# Patient Record
Sex: FEMALE
[De-identification: names, ages, dates, MRNs, and addresses within clinical notes are randomized per-mention and may not be internally consistent; named-entity substitution may affect disease eponyms.]

---

## 2017-03-14 ENCOUNTER — HOSPITAL (OUTPATIENT)
Dept: OTHER | Age: 27
End: 2017-03-14
Attending: EMERGENCY MEDICINE

## 2017-03-14 ENCOUNTER — IMAGING SERVICES (OUTPATIENT)
Dept: OTHER | Age: 27
End: 2017-03-14

## 2017-03-14 LAB
ALBUMIN SERPL-MCNC: 3.9 GM/DL (ref 3.6–5.1)
ALP SERPL-CCNC: 66 UNIT/L (ref 45–117)
ALT SERPL-CCNC: 15 UNIT/L
ANA SER QL IA: NEGATIVE
ANALYZER ANC (IANC): ABNORMAL
ANION GAP SERPL CALC-SCNC: 14 MMOL/L (ref 10–20)
AST SERPL-CCNC: 15 UNIT/L
BASOPHILS # BLD: 0 THOUSAND/MCL (ref 0–0.3)
BASOPHILS NFR BLD: 0 %
BILIRUB CONJ SERPL-MCNC: 0.1 MG/DL (ref 0–0.2)
BILIRUB SERPL-MCNC: 0.7 MG/DL (ref 0.2–1)
BUN SERPL-MCNC: 9 MG/DL (ref 6–20)
BUN/CREAT SERPL: 14 (ref 7–25)
CALCIUM SERPL-MCNC: 9.1 MG/DL (ref 8.4–10.2)
CHLORIDE: 103 MMOL/L (ref 98–107)
CO2 SERPL-SCNC: 26 MMOL/L (ref 21–32)
CREAT SERPL-MCNC: 0.64 MG/DL (ref 0.51–0.95)
DIFFERENTIAL METHOD BLD: ABNORMAL
EOSINOPHIL # BLD: 0.1 THOUSAND/MCL (ref 0.1–0.5)
EOSINOPHIL NFR BLD: 1 %
ERYTHROCYTE [DISTWIDTH] IN BLOOD: 11.3 % (ref 11–15)
ERYTHROCYTE [SEDIMENTATION RATE] IN BLOOD BY WESTERGREN METHOD: 5 MM/HR (ref 0–20)
GLUCOSE SERPL-MCNC: 111 MG/DL (ref 65–99)
HEMATOCRIT: 39.2 % (ref 36–46.5)
HGB BLD-MCNC: 13.8 GM/DL (ref 12–15.5)
LIPASE SERPL-CCNC: 111 UNIT/L (ref 73–393)
LYMPHOCYTES # BLD: 3.9 THOUSAND/MCL (ref 1–4.8)
LYMPHOCYTES NFR BLD: 33 %
MCH RBC QN AUTO: 32.4 PG (ref 26–34)
MCHC RBC AUTO-ENTMCNC: 35.2 GM/DL (ref 32–36.5)
MCV RBC AUTO: 92 FL (ref 78–100)
MONOCYTES # BLD: 0.6 THOUSAND/MCL (ref 0.3–0.9)
MONOCYTES NFR BLD: 5 %
NEUTROPHILS # BLD: 7.1 THOUSAND/MCL (ref 1.8–7.7)
NEUTROPHILS NFR BLD: 61 %
NEUTS SEG NFR BLD: ABNORMAL %
PERCENT NRBC: ABNORMAL
PLATELET # BLD: 246 THOUSAND/MCL (ref 140–450)
POTASSIUM SERPL-SCNC: 4 MMOL/L (ref 3.4–5.1)
PROT SERPL-MCNC: 7.2 GM/DL (ref 6.4–8.2)
RBC # BLD: 4.26 MILLION/MCL (ref 4–5.2)
RHEUMATOID FACT SER NEPH-ACNC: <10 UNIT/ML
SODIUM SERPL-SCNC: 139 MMOL/L (ref 135–145)
WBC # BLD: 11.7 THOUSAND/MCL (ref 4.2–11)

## 2017-03-17 ENCOUNTER — CHARTING TRANS (OUTPATIENT)
Dept: OTHER | Age: 27
End: 2017-03-17

## 2017-03-29 ENCOUNTER — CHARTING TRANS (OUTPATIENT)
Dept: OTHER | Age: 27
End: 2017-03-29

## 2017-03-29 ASSESSMENT — PAIN SCALES - GENERAL: PAINLEVEL_OUTOF10: 0

## 2018-11-05 VITALS
SYSTOLIC BLOOD PRESSURE: 110 MMHG | WEIGHT: 128 LBS | BODY MASS INDEX: 20.09 KG/M2 | DIASTOLIC BLOOD PRESSURE: 68 MMHG | HEIGHT: 67 IN

## 2022-01-28 ENCOUNTER — HOSPITAL ENCOUNTER (OUTPATIENT)
Dept: RADIOLOGY | Facility: HOSPITAL | Age: 32
Discharge: HOME | End: 2022-01-28
Attending: ORTHOPAEDIC SURGERY
Payer: COMMERCIAL

## 2022-01-28 ENCOUNTER — OFFICE VISIT (OUTPATIENT)
Dept: SPORTS MEDICINE | Facility: CLINIC | Age: 32
End: 2022-01-28
Payer: COMMERCIAL

## 2022-01-28 VITALS
BODY MASS INDEX: 21.94 KG/M2 | WEIGHT: 139.81 LBS | SYSTOLIC BLOOD PRESSURE: 121 MMHG | HEART RATE: 72 BPM | DIASTOLIC BLOOD PRESSURE: 81 MMHG | HEIGHT: 67 IN

## 2022-01-28 DIAGNOSIS — M25.512 LEFT SHOULDER PAIN, UNSPECIFIED CHRONICITY: ICD-10-CM

## 2022-01-28 DIAGNOSIS — M25.312 INSTABILITY OF BOTH SHOULDER JOINTS: Primary | ICD-10-CM

## 2022-01-28 DIAGNOSIS — M25.311 INSTABILITY OF BOTH SHOULDER JOINTS: Primary | ICD-10-CM

## 2022-01-28 PROCEDURE — 1160F RVW MEDS BY RX/DR IN RCRD: CPT | Mod: CPTII,S$GLB,, | Performed by: ORTHOPAEDIC SURGERY

## 2022-01-28 PROCEDURE — 73030 XR SHOULDER COMPLETE 2 OR MORE VIEWS LEFT: ICD-10-PCS | Mod: 26,LT,, | Performed by: RADIOLOGY

## 2022-01-28 PROCEDURE — 1159F MED LIST DOCD IN RCRD: CPT | Mod: CPTII,S$GLB,, | Performed by: ORTHOPAEDIC SURGERY

## 2022-01-28 PROCEDURE — 99204 OFFICE O/P NEW MOD 45 MIN: CPT | Mod: S$GLB,,, | Performed by: ORTHOPAEDIC SURGERY

## 2022-01-28 PROCEDURE — 99999 PR PBB SHADOW E&M-NEW PATIENT-LVL III: CPT | Mod: PBBFAC,,, | Performed by: ORTHOPAEDIC SURGERY

## 2022-01-28 PROCEDURE — 3074F SYST BP LT 130 MM HG: CPT | Mod: CPTII,S$GLB,, | Performed by: ORTHOPAEDIC SURGERY

## 2022-01-28 PROCEDURE — 3008F PR BODY MASS INDEX (BMI) DOCUMENTED: ICD-10-PCS | Mod: CPTII,S$GLB,, | Performed by: ORTHOPAEDIC SURGERY

## 2022-01-28 PROCEDURE — 3074F PR MOST RECENT SYSTOLIC BLOOD PRESSURE < 130 MM HG: ICD-10-PCS | Mod: CPTII,S$GLB,, | Performed by: ORTHOPAEDIC SURGERY

## 2022-01-28 PROCEDURE — 99204 PR OFFICE/OUTPT VISIT, NEW, LEVL IV, 45-59 MIN: ICD-10-PCS | Mod: S$GLB,,, | Performed by: ORTHOPAEDIC SURGERY

## 2022-01-28 PROCEDURE — 99999 PR PBB SHADOW E&M-NEW PATIENT-LVL III: ICD-10-PCS | Mod: PBBFAC,,, | Performed by: ORTHOPAEDIC SURGERY

## 2022-01-28 PROCEDURE — 1160F PR REVIEW ALL MEDS BY PRESCRIBER/CLIN PHARMACIST DOCUMENTED: ICD-10-PCS | Mod: CPTII,S$GLB,, | Performed by: ORTHOPAEDIC SURGERY

## 2022-01-28 PROCEDURE — 73030 X-RAY EXAM OF SHOULDER: CPT | Mod: TC,LT

## 2022-01-28 PROCEDURE — 3079F PR MOST RECENT DIASTOLIC BLOOD PRESSURE 80-89 MM HG: ICD-10-PCS | Mod: CPTII,S$GLB,, | Performed by: ORTHOPAEDIC SURGERY

## 2022-01-28 PROCEDURE — 73030 X-RAY EXAM OF SHOULDER: CPT | Mod: 26,LT,, | Performed by: RADIOLOGY

## 2022-01-28 PROCEDURE — 1159F PR MEDICATION LIST DOCUMENTED IN MEDICAL RECORD: ICD-10-PCS | Mod: CPTII,S$GLB,, | Performed by: ORTHOPAEDIC SURGERY

## 2022-01-28 PROCEDURE — 3008F BODY MASS INDEX DOCD: CPT | Mod: CPTII,S$GLB,, | Performed by: ORTHOPAEDIC SURGERY

## 2022-01-28 PROCEDURE — 3079F DIAST BP 80-89 MM HG: CPT | Mod: CPTII,S$GLB,, | Performed by: ORTHOPAEDIC SURGERY

## 2022-01-28 RX ORDER — DESOGESTREL AND ETHINYL ESTRADIOL 0.15-0.03
1 KIT ORAL DAILY
COMMUNITY
End: 2022-05-20 | Stop reason: SDUPTHER

## 2022-01-28 NOTE — PROGRESS NOTES
NEW PATIENT    HISTORY OF PRESENT ILLNESS   31 y.o. 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 She has a dog that keeps her busy. 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 She states that she feels like the shoulder dislocates. She states she also has neck pain.   She states her left shoulder has slipped out about 3 times.  She states she is feeling get unstable in her sleep.      - mechanical symptoms, + instability    Is affecting ADLs.  Pain is 2/10 at it's worst.     PAST MEDICAL HISTORY    History reviewed. No pertinent past medical history.    PAST SURGICAL HISTORY     Past Surgical History:   Procedure Laterality Date    APPENDECTOMY  2017    BIOPSY OF TONSILS  2005       FAMILY HISTORY    No family history on file.    SOCIAL HISTORY    Social History     Socioeconomic History    Marital status: Single       MEDICATIONS      Current Outpatient Medications:     desogestreL-ethinyl estradioL (APRI) 0.15-0.03 mg per tablet, Take 1 tablet by mouth once daily., Disp: , Rfl:     ALLERGIES     Review of patient's allergies indicates:  No Known Allergies      REVIEW OF SYSTEMS   Constitution: Negative. Negative for chills, fever and night sweats.   HENT: Negative for congestion and headaches.    Eyes: Negative for blurred vision, left vision loss and right vision loss.   Cardiovascular: Negative for chest pain and syncope.   Respiratory: Negative for cough and shortness of breath.    Endocrine: Negative for polydipsia, polyphagia and polyuria.   Hematologic/Lymphatic: Negative for bleeding problem. Does not bruise/bleed easily.   Skin: Negative for dry skin, itching and rash.   Musculoskeletal: Negative for falls. Positive for left shoulder pain  Gastrointestinal: Negative for abdominal pain and bowel incontinence.   Genitourinary: Negative for bladder incontinence and nocturia.   Neurological: Negative for disturbances in coordination, loss of balance and seizures.  "  Psychiatric/Behavioral: Negative for depression. The patient does not have insomnia.    Allergic/Immunologic: Negative for hives and persistent infections.     PHYSICAL EXAMINATION    Vitals: /81   Pulse 72   Ht 5' 7" (1.702 m)   Wt 63.4 kg (139 lb 12.8 oz)   BMI 21.90 kg/m²     General: The patient appears active and healthy with no apparent physical problems.  No disturbance of mood or affect is demonstrated. Alert and Oriented.    HEENT: Eyes normal, pupils equally round, nose normal.    Resp: Equal and symmetrical chest rises. No wheezing    CV: Regular rate    Neck: Supple; nonpainful range of motion.    Extremities: no cyanosis, clubbing, edema, or diffuse swelling.  Palpable pulses, good capillary refill of the digits.  No coolness, discoloration, edema or obvious varicosities.    Skin: no lesions noted.    Lymphatic: no detected adenopathy in the upper or lower extremities.    Neurologic: normal mental status, normal reflexes, normal gait and balance.  Patient is alert and oriented to person, place and time.  No flaccidity or spasticity is noted.  No motor or sensory deficits are noted.  Light touch is intact    Orthopaedic:     SHOULDER EXAM - LEFT    Inspection:   Normal skin color and appearance with no scars.  No muscle atrophy noted.  No scapular winging.      Palpation: No tenderness of the acromioclavicular joint, lateral edge of the acromion, biceps tendon, trapezius muscle or scapulothoracic bursa.      ROM:      PROM:     FE - 180°    Abd/ER -  90°  Abd/IR -  45°   Add/ER -  90°     AROM:    FE - 180°    Abd/ER -  90°  Abd/IR -  45°   Add/ER -  90    Tests:     - Aguirre, - Neer's, - Cross Arm Adduction, - Cowley, - Yerguson, - Speed. - Belly Press,  - Jobes, - Lift Off    Stability: + sulcus, - apprehension, - relocation, - load and shift, - DLS  2+ anterior 2+ inferior and 3+ posterior load and shift    Motor:  Rotator cuff strength is 5/5 supraspinatus, 5/5 infraspinatus, 5/5 " subscapularis. Biceps, triceps and deltoid strength is 5/5.      Neuro     Distally there are no paresthesias, and sensation is intact to light touch in the median, ulnar, and radial distributions.  Reflexes are 2/2 biceps, triceps and brachioradialis.      Beighton scale: 5/9    IMAGING    X-Ray Shoulder 2 or More Views Left  Narrative: EXAMINATION:  XR SHOULDER COMPLETE 2 OR MORE VIEWS LEFT    CLINICAL HISTORY:  Pain in left shoulder    TECHNIQUE:  Two or three views of the left shoulder were performed.    COMPARISON:  None    FINDINGS:  Glenohumeral joint space is normal.  Bony structures are intact.  No soft tissue calcification is seen.  Impression: Normal findings    Electronically signed by: Kiel Burgess MD  Date:    01/28/2022  Time:    10:26      IMPRESSION       ICD-10-CM ICD-9-CM   1. Instability of both shoulder joints  M25.311 718.81    M25.312    2. Left shoulder pain, unspecified chronicity  M25.512 719.41       MEDICATIONS PRESCRIBED      1. None    RECOMMENDATIONS     1. Referral to physical therapy placed today.  The patient has clearly demonstrated hyperlaxity with a diagnosis of multidirectional instability.  I educated the patient concerning this diagnosis and treatment options.  2. Posture shirt recommended; work desk modifications recommended  3. RTC in 6 weeks to assess improvement with therapy      All questions were answered, pt will contact us for questions or concerns in the interim.

## 2022-01-31 ENCOUNTER — CLINICAL SUPPORT (OUTPATIENT)
Dept: REHABILITATION | Facility: HOSPITAL | Age: 32
End: 2022-01-31
Attending: ORTHOPAEDIC SURGERY
Payer: COMMERCIAL

## 2022-01-31 DIAGNOSIS — M25.512 CHRONIC PAIN OF BOTH SHOULDERS: ICD-10-CM

## 2022-01-31 DIAGNOSIS — M25.511 CHRONIC PAIN OF BOTH SHOULDERS: ICD-10-CM

## 2022-01-31 DIAGNOSIS — R29.898 SHOULDER WEAKNESS: ICD-10-CM

## 2022-01-31 DIAGNOSIS — M25.312 INSTABILITY OF BOTH SHOULDER JOINTS: ICD-10-CM

## 2022-01-31 DIAGNOSIS — G89.29 CHRONIC PAIN OF BOTH SHOULDERS: ICD-10-CM

## 2022-01-31 DIAGNOSIS — M25.311 INSTABILITY OF BOTH SHOULDER JOINTS: ICD-10-CM

## 2022-01-31 PROCEDURE — 97110 THERAPEUTIC EXERCISES: CPT

## 2022-01-31 PROCEDURE — 97161 PT EVAL LOW COMPLEX 20 MIN: CPT

## 2022-02-03 PROBLEM — M25.519 SHOULDER PAIN: Status: ACTIVE | Noted: 2022-02-03

## 2022-02-03 PROBLEM — R29.898 SHOULDER WEAKNESS: Status: ACTIVE | Noted: 2022-02-03

## 2022-02-03 NOTE — PLAN OF CARE
[facility]  [place] [address]   Physical Therapy Initial Evaluation    Name: Kala Raya  Clinic Number: [id number]    Therapy Diagnosis:   Encounter Diagnoses   Name Primary?   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 She states that she deals with recurrent subluxations and dislocations. There is particularly an issue on her L shoulder. She typically sleeps on her side and is unable to do so because because she has intense pain when sleeping on that side and feels like she is subluxing. Patient states she feels that her shoulders have gotten worse since she has not exercised recently/kept her upper body strong. 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 She denies any PMHx aside from her past injury.    No past medical history on file.  Kala Raya  has a past surgical history that includes Biopsy of tonsils (2005) and Appendectomy (2017).    Kala has a current medication list which includes the following prescription(s): desogestrel-ethinyl estradiol.    Review of patient's allergies indicates:  No Known Allergies     Pain:  Current 2/10, worst 6/10, best 0/10   Location: bilateral shoulder  "  Description: Aching, Grabbing and Sharp  Aggravating Factors: Bending, Walking, Extension, Flexing, Lifting and Getting out of bed/chair  Easing Factors: pain medication, ice and rest    Prior Therapy: None  Social History:  lives alone  Occupation: Investigator   Prior Level of Function: Fully functioning   Current Level of Function: Unable to perform all duties for work as well as self-care ADL's    Pts goals: "I want to be able to sleep, exercise, and do my job without pain"     Objective     Observation: Patient arrives to clinic in no apparent distress     Range of Motion:   AROM Right Left Comment   Shoulder Elevatiom: 180 degrees 180 degrees    PROM Right Left Comment   Shoulder Flexion: 180 degrees 180 degrees    Shoulder Abduction: 180 degrees 180 degrees    Shoulder ER, 90°ABD: 70 degrees 70 degrees    Shoulder IR, 90° ABD: 90 degrees 90 degrees      Strength:    Right Left Comment   Shoulder flexion: 4/5 4/5    Shoulder Abduction: 4/5 4/5    Shoulder ER: 3+/5 3+/5    Shoulder IR: 3+/5 3+/5      Scapular Control/Dyskinesis: No demonstration of upward rotation during FE of the shoulder     Special Tests: +Sulcus sign SALMA; + anterior apprehension SALMA, +anterior load shift on the L, -negative load shift on the R    Palpation: TTP at the upper trap and cervical sub-occipitals       CMS Impairment/Limitation/Restriction for FOTO Survey    Therapist reviewed FOTO scores for Kala Raya on 1/31/2022.   FOTO documents entered into EPIC - see Media section.    Limitation Score: see media section   Category: Mobility     TREATMENT   Treatment Time In: 1:30PM  Treatment Time Out: 2PM  Total Treatment time separate from Evaluation time:30 minutes     Kala received therapeutic exercises to develop strength, endurance, ROM and flexibility for 30 minutes including:  Wall Ball ABC's x3   Scap Pinches 10x10s  TB ER walkout 2x10    Education     Home Exercises Provided and Patient Education " Provided         Education provided:   - Stabilization   -Importance of rotator cuff strength  -importance of gregg-scapular strength and neuro mobility     Written Home Exercises Provided: yes.  Exercises were reviewed and Kala was able to demonstrate them prior to the end of the session.  Kala demonstrated good  understanding of the education provided.     See EMR under Patient Instructions for exercises provided 1/31/2022.      Assessment   Kala is a 31 y.o. female referred to outpatient Physical Therapy with a medical diagnosis of SALMA shoulder instability. Pt presents with decreased rotator cuff strength into IR/ER, decreased gregg-scapular strength, poor scapular-humeral rhythm, and positive stability special tests. These impairments prevent the patient from performing ADL's such as reaching overhead, lifting objects from the ground, walking her dog, and exercising.     Pt prognosis is Good.   Pt will benefit from skilled outpatient Physical Therapy to address the deficits stated above and in the chart below, provide pt/family education, and to maximize pt's level of independence.     Plan of care discussed with patient: Yes  Pt's spiritual, cultural and educational needs considered and pt agreeable to plan of care and goals as stated below:     Anticipated Barriers for therapy: COVID-19, chronicity of the condition     Medical Necessity is demonstrated by the following  History  Co-morbidities and personal factors that may impact the plan of care Co-morbidities:   none    Personal Factors:   no deficits     low   Examination  Body Structures and Functions, activity limitations and participation restrictions that may impact the plan of care Body Regions:   upper extremities    Body Systems:    gross symmetry  ROM  strength  motor control  motor learning    Participation Restrictions:   Exercise, grocery shopping, socializing,     Activity limitations:   Mobility  lifting and carrying objects  fine hand use  (grasping/picking up)  driving (bike, car, motorcycle)    Self care  washing oneself (bathing, drying, washing hands)  caring for body parts (brushing teeth, shaving, grooming)  eating  drinking  looking after one's health    Domestic Life  cooking  doing house work (cleaning house, washing dishes, laundry)  assisting others    Community and Social Life  no deficits         low   Clinical Presentation stable and uncomplicated low   Decision Making/ Complexity Score: low     Goals   ST-4 Weeks  1. Patient will demonstrate HEP independence by the end of Week 4.  2. Patient will demonstrate a FOTO score improvement of at least 9 points prior to the end of Week 4.  3. Patient will demonstrate no 4/5 mid-trap, and low trap, and RC strength via MMT by the end of Week 4  LT-10 Weeks  1. Patient will demonstrate a FOTO score improvement of at least 18 points prior to discharge.  2. Patient will demonstrate and pass a CKC stability test prior to discharge from therapy.  3. Patient will go 72 consecutive hours without a subluxation event prior to discharge.  Plan   Certification Period: 2022 to 2022.    Outpatient Physical Therapy 2 times weekly for 10 weeks to include the following interventions: patient education, Electrical Stimulation NMES, Manual Therapy, Moist Heat/ Ice, Neuromuscular Re-ed, Patient Education, Self Care, Therapeutic Activities and Therapeutic Exercise.     Leonides Monroy, PT

## 2022-02-07 ENCOUNTER — CLINICAL SUPPORT (OUTPATIENT)
Dept: REHABILITATION | Facility: HOSPITAL | Age: 32
End: 2022-02-07
Attending: ORTHOPAEDIC SURGERY
Payer: COMMERCIAL

## 2022-02-07 DIAGNOSIS — M25.511 CHRONIC PAIN OF BOTH SHOULDERS: ICD-10-CM

## 2022-02-07 DIAGNOSIS — G89.29 CHRONIC PAIN OF BOTH SHOULDERS: ICD-10-CM

## 2022-02-07 DIAGNOSIS — R29.898 SHOULDER WEAKNESS: ICD-10-CM

## 2022-02-07 DIAGNOSIS — M25.512 CHRONIC PAIN OF BOTH SHOULDERS: ICD-10-CM

## 2022-02-07 PROCEDURE — 97112 NEUROMUSCULAR REEDUCATION: CPT

## 2022-02-07 PROCEDURE — 97110 THERAPEUTIC EXERCISES: CPT

## 2022-02-08 NOTE — PROGRESS NOTES
"  Physical Therapy Daily Treatment Note   [place] [facility]      Visit Date: 2/7/2022    Name: Kala Raya  Clinic Number: [id number]    Therapy Diagnosis:   Encounter Diagnoses   Name Primary?    Shoulder weakness     Chronic pain of both shoulders      Physician: Michaelle Sy MD    Physician Orders: PT Eval and Treat   Medical Diagnosis from Referral: Bilateral Shoulder Instability   Evaluation Date: 1/31/2022  Authorization Period Expiration: 6/1/2022  Plan of Care Expiration: 5/1/2022  Progress Note Due: 5/1/2022  Visit # / Visits authorized: 2/ 20   FOTO: Issued Visit # 1        Time In: 11AM  Time Out: 12PM  Total Billable Time: 30 minutes     Precautions: Standard      Subjective      Pt reports: "My neck and shoulders don't hurt as much"     she was compliant with home exercise program given last session.   Response to previous treatment:Improved shoulder pain SALMA   Functional change: none at this time     Pain: 0/10  Location: bilateral shoulder      Objective     Measurements taken:      Kala received therapeutic exercises to develop strength, endurance, ROM and flexibility for 25 minutes including:  Scap Pinches 10x10s  TB walkouts IR/ER 2x10  Wall Ball ABC's x3 fwd/lat     Kala received the following manual therapy techniques: Joint mobilizations and Soft tissue Mobilization were applied to the: SALMA Shoulder for 0 minutes, including:  NP    Kala participated in neuromuscular re-education activities to improve: Balance, Coordination, Kinesthetic, Sense and Proprioception for 15 minutes. The following activities were included:  Serratus Wall Slide 2x12 VC for shoulder ER   Chin Tucks 2x10,10s    Kala participated in dynamic functional therapeutic activities to improve functional performance for 0  minutes, including:  NP      Home Exercises Provided and Patient Education Provided     Education provided:   - Importance of shoulder stabilization  -Scapular strength  -Re-education on scap pinches "     Written Home Exercises Provided: Patient instructed to cont prior HEP.  Exercises were reviewed and Kala was able to demonstrate them prior to the end of the session.  Kala demonstrated good  understanding of the education provided.     See EMR under Patient Instructions for exercises provided 2022.      Assessment     Patient neck and shoulder pain significantly decreased on this date. She benefited greatly from HEP compliance and stabilization work. She is quick to fatigue but does not currently note any pain with any of her interventions. She is progressing well and demonstrates better shoulder upward rotation than at the initial evaluation     Kala Is progressing well towards her goals.   Pt prognosis is Good.     Pt will continue to benefit from skilled outpatient physical therapy to address the deficits listed in the problem list box on initial evaluation, provide pt/family education and to maximize pt's level of independence in the home and community environment.     Pt's spiritual, cultural and educational needs considered and pt agreeable to plan of care and goals.    Anticipated barriers to physical therapy: none    Goals:     ST-4 Weeks  1. Patient will demonstrate HEP independence by the end of Week 4.  2. Patient will demonstrate a FOTO score improvement of at least 9 points prior to the end of Week 4.  3. Patient will demonstrate no 4/5 mid-trap, and low trap, and RC strength via MMT by the end of Week 4  LT-10 Weeks  1. Patient will demonstrate a FOTO score improvement of at least 18 points prior to discharge.  2. Patient will demonstrate and pass a CKC stability test prior to discharge from therapy.  3. Patient will go 72 consecutive hours without a subluxation event prior to discharge.      Plan     Continue with established Plan of Care towards PT goals with focus on decreasing pain, increasing ROM, strength, neuromuscular control and functional status       Leonides Monroy, PT

## 2022-02-11 ENCOUNTER — CLINICAL SUPPORT (OUTPATIENT)
Dept: REHABILITATION | Facility: HOSPITAL | Age: 32
End: 2022-02-11
Attending: ORTHOPAEDIC SURGERY
Payer: COMMERCIAL

## 2022-02-11 DIAGNOSIS — M25.511 CHRONIC PAIN OF BOTH SHOULDERS: ICD-10-CM

## 2022-02-11 DIAGNOSIS — M25.512 CHRONIC PAIN OF BOTH SHOULDERS: ICD-10-CM

## 2022-02-11 DIAGNOSIS — G89.29 CHRONIC PAIN OF BOTH SHOULDERS: ICD-10-CM

## 2022-02-11 DIAGNOSIS — R29.898 SHOULDER WEAKNESS: ICD-10-CM

## 2022-02-11 PROCEDURE — 97110 THERAPEUTIC EXERCISES: CPT

## 2022-02-11 PROCEDURE — 97112 NEUROMUSCULAR REEDUCATION: CPT

## 2022-02-17 NOTE — PROGRESS NOTES
"  Physical Therapy Daily Treatment Note   [place] [facility]      Visit Date: 2/11/2022    Name: Kala Raya  Clinic Number: [id number]    Therapy Diagnosis:   Encounter Diagnoses   Name Primary?    Shoulder weakness     Chronic pain of both shoulders      Physician: Michaelle Sy MD    Physician Orders: PT Eval and Treat   Medical Diagnosis from Referral: Bilateral Shoulder Instability   Evaluation Date: 1/31/2022  Authorization Period Expiration: 6/1/2022  Plan of Care Expiration: 5/1/2022  Progress Note Due: 5/1/2022  Visit # / Visits authorized: 3/ 20   FOTO: Issued Visit # 1        Time In: 8AM  Time Out: 9AM  Total Billable Time: 30 minutes     Precautions: Standard      Subjective      Pt reports: "It's definitely getting better, I feel like I can sleep on my shoulder now"     she was compliant with home exercise program given last session.   Response to previous treatment:Improved shoulder pain SALMA   Functional change: can now sleep on her SALMA shoulders     Pain: 0/10  Location: bilateral shoulder      Objective     Measurements taken:      Kala received therapeutic exercises to develop strength, endurance, ROM and flexibility for 25 minutes including:  Scap Pinches 10x10s  TB walkouts IR/ER 2x10  Wall Ball ABC's x3 fwd/lat   BB 4x30s IR/ER ea     Kala received the following manual therapy techniques: Joint mobilizations and Soft tissue Mobilization were applied to the: SALMA Shoulder for 0 minutes, including:  NP    Kala participated in neuromuscular re-education activities to improve: Balance, Coordination, Kinesthetic, Sense and Proprioception for 25 minutes. The following activities were included:  Serratus Wall Slide 2x12 VC for shoulder ER   Chin Tucks 2x10,10s  Prone Mid-Trap Activation 3x10,     Kala participated in dynamic functional therapeutic activities to improve functional performance for 0  minutes, including:  NP      Home Exercises Provided and Patient Education Provided     Education " provided:   - Importance of shoulder stabilization  -Scapular strength  -Re-education on scap pinches     Written Home Exercises Provided: Patient instructed to cont prior HEP.  Exercises were reviewed and Kala was able to demonstrate them prior to the end of the session.  Kala demonstrated good  understanding of the education provided.     See EMR under Patient Instructions for exercises provided 2022.      Assessment     Patients shoulder stability is significantly improved on this date. She requires verbal cues in order to assure that she does not extend through her TL junction during standing scapular interventions. When cued to engage her core stability she struggled to maintain at scap retraction at elevated positions. Plan to continue to strengthen her core as well as her gregg-scapular musculature.     Kala Is progressing well towards her goals.   Pt prognosis is Good.     Pt will continue to benefit from skilled outpatient physical therapy to address the deficits listed in the problem list box on initial evaluation, provide pt/family education and to maximize pt's level of independence in the home and community environment.     Pt's spiritual, cultural and educational needs considered and pt agreeable to plan of care and goals.    Anticipated barriers to physical therapy: none    Goals:     ST-4 Weeks  1. Patient will demonstrate HEP independence by the end of Week 4.  2. Patient will demonstrate a FOTO score improvement of at least 9 points prior to the end of Week 4.  3. Patient will demonstrate no 4/5 mid-trap, and low trap, and RC strength via MMT by the end of Week 4  LT-10 Weeks  1. Patient will demonstrate a FOTO score improvement of at least 18 points prior to discharge.  2. Patient will demonstrate and pass a CKC stability test prior to discharge from therapy.  3. Patient will go 72 consecutive hours without a subluxation event prior to discharge.      Plan     Continue with  established Plan of Care towards PT goals with focus on decreasing pain, increasing ROM, strength, neuromuscular control and functional status       Leonides Monroy, PT

## 2022-03-22 ENCOUNTER — TELEPHONE (OUTPATIENT)
Dept: REHABILITATION | Facility: HOSPITAL | Age: 32
End: 2022-03-22
Payer: COMMERCIAL

## 2022-03-22 NOTE — TELEPHONE ENCOUNTER
LVM about scheduling physical therapy appointments with Leonides Monroy. Left call back number.     Shonda Ding MS, LAT, ATC

## 2022-05-20 ENCOUNTER — OFFICE VISIT (OUTPATIENT)
Dept: OBSTETRICS AND GYNECOLOGY | Facility: CLINIC | Age: 32
End: 2022-05-20
Payer: COMMERCIAL

## 2022-05-20 VITALS
HEIGHT: 67 IN | DIASTOLIC BLOOD PRESSURE: 72 MMHG | SYSTOLIC BLOOD PRESSURE: 120 MMHG | BODY MASS INDEX: 21.52 KG/M2 | WEIGHT: 137.13 LBS

## 2022-05-20 DIAGNOSIS — Z12.4 PAP SMEAR FOR CERVICAL CANCER SCREENING: ICD-10-CM

## 2022-05-20 DIAGNOSIS — Z72.0 TOBACCO USE: ICD-10-CM

## 2022-05-20 DIAGNOSIS — Z30.41 ORAL CONTRACEPTIVE PILL SURVEILLANCE: ICD-10-CM

## 2022-05-20 DIAGNOSIS — Z00.00 ANNUAL PHYSICAL EXAM: Primary | ICD-10-CM

## 2022-05-20 PROCEDURE — 3078F PR MOST RECENT DIASTOLIC BLOOD PRESSURE < 80 MM HG: ICD-10-PCS | Mod: CPTII,S$GLB,, | Performed by: OBSTETRICS & GYNECOLOGY

## 2022-05-20 PROCEDURE — 3008F BODY MASS INDEX DOCD: CPT | Mod: CPTII,S$GLB,, | Performed by: OBSTETRICS & GYNECOLOGY

## 2022-05-20 PROCEDURE — 1159F MED LIST DOCD IN RCRD: CPT | Mod: CPTII,S$GLB,, | Performed by: OBSTETRICS & GYNECOLOGY

## 2022-05-20 PROCEDURE — 3074F PR MOST RECENT SYSTOLIC BLOOD PRESSURE < 130 MM HG: ICD-10-PCS | Mod: CPTII,S$GLB,, | Performed by: OBSTETRICS & GYNECOLOGY

## 2022-05-20 PROCEDURE — 1160F RVW MEDS BY RX/DR IN RCRD: CPT | Mod: CPTII,S$GLB,, | Performed by: OBSTETRICS & GYNECOLOGY

## 2022-05-20 PROCEDURE — 1159F PR MEDICATION LIST DOCUMENTED IN MEDICAL RECORD: ICD-10-PCS | Mod: CPTII,S$GLB,, | Performed by: OBSTETRICS & GYNECOLOGY

## 2022-05-20 PROCEDURE — 3078F DIAST BP <80 MM HG: CPT | Mod: CPTII,S$GLB,, | Performed by: OBSTETRICS & GYNECOLOGY

## 2022-05-20 PROCEDURE — 88141 PR  CYTOPATH CERV/VAG INTERPRET: ICD-10-PCS | Mod: ,,, | Performed by: STUDENT IN AN ORGANIZED HEALTH CARE EDUCATION/TRAINING PROGRAM

## 2022-05-20 PROCEDURE — 99385 PR PREVENTIVE VISIT,NEW,18-39: ICD-10-PCS | Mod: S$GLB,,, | Performed by: OBSTETRICS & GYNECOLOGY

## 2022-05-20 PROCEDURE — 3074F SYST BP LT 130 MM HG: CPT | Mod: CPTII,S$GLB,, | Performed by: OBSTETRICS & GYNECOLOGY

## 2022-05-20 PROCEDURE — 99999 PR PBB SHADOW E&M-EST. PATIENT-LVL III: CPT | Mod: PBBFAC,,, | Performed by: OBSTETRICS & GYNECOLOGY

## 2022-05-20 PROCEDURE — 87624 HPV HI-RISK TYP POOLED RSLT: CPT | Performed by: OBSTETRICS & GYNECOLOGY

## 2022-05-20 PROCEDURE — 99385 PREV VISIT NEW AGE 18-39: CPT | Mod: S$GLB,,, | Performed by: OBSTETRICS & GYNECOLOGY

## 2022-05-20 PROCEDURE — 99999 PR PBB SHADOW E&M-EST. PATIENT-LVL III: ICD-10-PCS | Mod: PBBFAC,,, | Performed by: OBSTETRICS & GYNECOLOGY

## 2022-05-20 PROCEDURE — 3008F PR BODY MASS INDEX (BMI) DOCUMENTED: ICD-10-PCS | Mod: CPTII,S$GLB,, | Performed by: OBSTETRICS & GYNECOLOGY

## 2022-05-20 PROCEDURE — 88141 CYTOPATH C/V INTERPRET: CPT | Mod: ,,, | Performed by: STUDENT IN AN ORGANIZED HEALTH CARE EDUCATION/TRAINING PROGRAM

## 2022-05-20 PROCEDURE — 1160F PR REVIEW ALL MEDS BY PRESCRIBER/CLIN PHARMACIST DOCUMENTED: ICD-10-PCS | Mod: CPTII,S$GLB,, | Performed by: OBSTETRICS & GYNECOLOGY

## 2022-05-20 PROCEDURE — 88175 CYTOPATH C/V AUTO FLUID REDO: CPT | Performed by: STUDENT IN AN ORGANIZED HEALTH CARE EDUCATION/TRAINING PROGRAM

## 2022-05-20 RX ORDER — IBUPROFEN 200 MG
200 TABLET ORAL EVERY 6 HOURS PRN
COMMUNITY

## 2022-05-20 RX ORDER — DESOGESTREL AND ETHINYL ESTRADIOL 0.15-0.03
1 KIT ORAL DAILY
Qty: 90 TABLET | Refills: 4 | Status: SHIPPED | OUTPATIENT
Start: 2022-05-20 | End: 2023-07-13 | Stop reason: SDUPTHER

## 2022-05-20 NOTE — PROGRESS NOTES
GYN Annual exam  2022    Chief Complaint   Patient presents with    Well Woman    Contraception         HISTORY OF PRESENT ILLNESS:  Pt is a  31 y.o.  alert female who presents today for GYN annual exam. She has been on OCP's for many years. She stopped them for about a year during the pandemic but had painful periods that lasted 7 days with acne and mood swings. She restarted her combined OCP's and now has periods each month lasting 4 days that are minimally painful and her mood has improved. She does smoke cigarettes.   Not currently sexually active for about a year. Prefers male partners when sexually active.   No other concerns.     Patient's last menstrual period was 2022 (approximate).    Review of patient's allergies indicates:  No Known Allergies    Current Outpatient Medications on File Prior to Visit   Medication Sig Dispense Refill    ibuprofen (ADVIL,MOTRIN) 200 MG tablet Take 200 mg by mouth every 6 (six) hours as needed for Pain.       desogestreL-ethinyl estradioL (APRI) 0.15-0.03 mg per tablet Take 1 tablet by mouth once daily.       No current facility-administered medications on file prior to visit.       Past Medical History:   Diagnosis Date    No pertinent past medical history             Past Surgical History:   Procedure Laterality Date    APPENDECTOMY  2017    BIOPSY OF TONSILS         Social History     Socioeconomic History    Marital status: Single   Tobacco Use    Smoking status: Current Every Day Smoker    Smokeless tobacco: Never Used   Substance and Sexual Activity    Alcohol use: Yes    Drug use: Never    Sexual activity: Not Currently     Partners: Male     Birth control/protection: OCP                     Family History   Problem Relation Age of Onset    Breast cancer Neg Hx     Colon cancer Neg Hx     Ovarian cancer Neg Hx        OB History    Para Term  AB Living   0 0 0 0 0 0   SAB IAB Ectopic Multiple Live Births   0 0 0 0 0  "    Gynecological History:     Menses as above. Denies history of STD's. No history of abnormal pap smears.     REVIEW OF SYSTEMS:  Constitutional:  Denies Headache.  No weight changes.  No fevers or chills.    HEENT:  Denies vision changes or hearing changes. No sinus problems.  Breasts:  Denies breast masses, pain or nipple discharge on right breast. Small breast cyst at 6 o'clock on left breast just inferior to the areaola. This has been present for many years and is not bothersome.    Respiratory:  No breathing issues, cough or shortness of breath  Cardiovascular:  Denies chest pain, syncope or palpitations.    GI:  Denies nausea, vomiting, diarrhea, or constipation  Endocrine:  Denies hot flashes, night sweats, heat or cold intolerance.  Hematologic:  Denies easy bruising or bleeding disorders.  Allergies/Immunologic:  Denies seasonal allergies or any history of immunologic disorders  Neurologic:  Normal sensation and motor control.  No history of seizures or syncope.  Musculoskeletal:  Denies joint pain, swelling, or erythema  Skin:  Denies rashes, significant lesions or pruritis.  Psychiatric:  Denies anxiety, depression, memory deficits, and appetite or sleep changes.      PHYSICAL EXAM  /72   Ht 5' 7" (1.702 m)   Wt 62.2 kg (137 lb 2 oz)   LMP 04/29/2022 (Approximate)   BMI 21.48 kg/m²   GENERAL APPEARANCE:  The patient is a pleasant, normal appearing female with normal affect and in no distress.  NECK: Supple.  No cervical lymphadenopathy.  No thyromegaly, no nodules palpated, trachea midline.  LUNGS: Clear bilaterally with normal respiratory effort  HEART:   Regular rate and rhythm.  No murmurs noted.    BREASTS: Breast exam performed supine.  No masses, non-tender, no nipple discharge or lymphadenopathy. Small <0.5 cm palpable cystic area in left breast at 6 o'clock just inferior to the areola.  ABDOMEN: Soft, non-tender, non-distended.  No hepatosplenomegaly.  No umbilical or inguinal " hernias.  SKIN:  Warm and dry to touch.  No lesions or rashes noted.    PSYCHIATRIC/NEUROLOGIC:  Appropriate mood and affect, normal recall, alert and oriented x 3  EXTREMITIES:  Warm and well perfused.  No edema noted.  Muscle strength and sensation are normal bilaterally 5/5 in both upper and lower extremities.   :  Vulva: Inspection of her external genitalia reveals normal mons pubis, labia minora and labia majora.  Normal appearing clitoris, urethral meatus and Topanga's glands.    Bladder:  No evidence of urethral or bladder tenderness.    Vagina:  Speculum exam reveals pink and moist vaginal mucosa.  Bartholin gland is normal to palpation.  Cervix:  Cervix is normal in appearance with no lesions.  There is no cervical motion tenderness.  Uterus:  Uterus is normal size, mobile and non-tender.  No adnexal masses are palpated.  Adnexa are non-tender to palpation  Perineum:  Perineum appears normal.  Anus:  Normal with no apparent lesions.       ASSESSMENT/PLAN:  Pt is a  31 y.o.  alert female who presents today for GYN annual exam.  - Pap with HPV done and pending  - GCCT and Trich testing declinded  - Serum STD testing declined  - Contraception: happy with combined OCP's Refilled today. She denies contraindications including no history of DVT/PE, stroke, HTN, migraines with aura.   She does smoke tobacco and I encouraged her to quit.   - Clinical breast examination performed today after discussion with the patient, She did have a small cystic area on the left breast as above. She notes she's had an US of this in the past and it has not changed over time. No family history of breast cancer. It is not bothersome to her. Will continue to monitor clinically at annual exams. I asked her to contact me if she has bothersome breast symptoms and further work up with imaging and exam will be completed.   - Gardasil 9 vaccination series : Completed series.   - Mammogram: Discussions regarding screening mammography  to start at age 40 per the most current ACOG guidelines  - We discussed consistent aerobic exercise, consistent seatbelt use, and consistent women's multivitamin use with both vitamin D and calcium supplementation; self-breast awareness was discussed and taught; the new Pap smear guidelines were reviewed   Pt voiced understanding of all counseling and instructions; no barriers to learning  RTO in 1 year or PRN        Aidan Oneil  5/20/2022

## 2022-05-26 LAB
HPV HR 12 DNA SPEC QL NAA+PROBE: POSITIVE
HPV16 AG SPEC QL: NEGATIVE
HPV18 DNA SPEC QL NAA+PROBE: NEGATIVE

## 2022-05-27 LAB
FINAL PATHOLOGIC DIAGNOSIS: NORMAL
Lab: NORMAL

## 2022-06-06 ENCOUNTER — PATIENT MESSAGE (OUTPATIENT)
Dept: OBSTETRICS AND GYNECOLOGY | Facility: CLINIC | Age: 32
End: 2022-06-06
Payer: COMMERCIAL

## 2022-06-06 ENCOUNTER — TELEPHONE (OUTPATIENT)
Dept: OBSTETRICS AND GYNECOLOGY | Facility: CLINIC | Age: 32
End: 2022-06-06
Payer: COMMERCIAL

## 2022-06-06 DIAGNOSIS — Z11.3 SCREEN FOR STD (SEXUALLY TRANSMITTED DISEASE): Primary | ICD-10-CM

## 2022-06-06 NOTE — TELEPHONE ENCOUNTER
ID confirmed.   Reviewed pap NILM but HPV + other high risk types.   Answered questions.  Recommended follow up in 1 year for pap with HPV testing.  STD screening recommended and orders placed.     Aidan Oneil

## 2022-06-29 ENCOUNTER — OFFICE VISIT (OUTPATIENT)
Dept: URGENT CARE | Facility: CLINIC | Age: 32
End: 2022-06-29
Payer: COMMERCIAL

## 2022-06-29 VITALS
RESPIRATION RATE: 16 BRPM | DIASTOLIC BLOOD PRESSURE: 67 MMHG | OXYGEN SATURATION: 99 % | BODY MASS INDEX: 21.5 KG/M2 | HEIGHT: 67 IN | WEIGHT: 137 LBS | SYSTOLIC BLOOD PRESSURE: 157 MMHG | TEMPERATURE: 98 F | HEART RATE: 98 BPM

## 2022-06-29 DIAGNOSIS — S31.105A: ICD-10-CM

## 2022-06-29 DIAGNOSIS — H65.93 FLUID LEVEL BEHIND TYMPANIC MEMBRANE OF BOTH EARS: Primary | ICD-10-CM

## 2022-06-29 LAB
CTP QC/QA: YES
SARS-COV-2 RDRP RESP QL NAA+PROBE: NEGATIVE

## 2022-06-29 PROCEDURE — 99213 OFFICE O/P EST LOW 20 MIN: CPT | Mod: S$GLB,,, | Performed by: PHYSICIAN ASSISTANT

## 2022-06-29 PROCEDURE — U0002 COVID-19 LAB TEST NON-CDC: HCPCS | Mod: QW,S$GLB,, | Performed by: PHYSICIAN ASSISTANT

## 2022-06-29 PROCEDURE — 3078F DIAST BP <80 MM HG: CPT | Mod: CPTII,S$GLB,, | Performed by: PHYSICIAN ASSISTANT

## 2022-06-29 PROCEDURE — 1160F PR REVIEW ALL MEDS BY PRESCRIBER/CLIN PHARMACIST DOCUMENTED: ICD-10-PCS | Mod: CPTII,S$GLB,, | Performed by: PHYSICIAN ASSISTANT

## 2022-06-29 PROCEDURE — 3078F PR MOST RECENT DIASTOLIC BLOOD PRESSURE < 80 MM HG: ICD-10-PCS | Mod: CPTII,S$GLB,, | Performed by: PHYSICIAN ASSISTANT

## 2022-06-29 PROCEDURE — 3008F BODY MASS INDEX DOCD: CPT | Mod: CPTII,S$GLB,, | Performed by: PHYSICIAN ASSISTANT

## 2022-06-29 PROCEDURE — 1159F PR MEDICATION LIST DOCUMENTED IN MEDICAL RECORD: ICD-10-PCS | Mod: CPTII,S$GLB,, | Performed by: PHYSICIAN ASSISTANT

## 2022-06-29 PROCEDURE — 1160F RVW MEDS BY RX/DR IN RCRD: CPT | Mod: CPTII,S$GLB,, | Performed by: PHYSICIAN ASSISTANT

## 2022-06-29 PROCEDURE — 3008F PR BODY MASS INDEX (BMI) DOCUMENTED: ICD-10-PCS | Mod: CPTII,S$GLB,, | Performed by: PHYSICIAN ASSISTANT

## 2022-06-29 PROCEDURE — 1159F MED LIST DOCD IN RCRD: CPT | Mod: CPTII,S$GLB,, | Performed by: PHYSICIAN ASSISTANT

## 2022-06-29 PROCEDURE — 3077F PR MOST RECENT SYSTOLIC BLOOD PRESSURE >= 140 MM HG: ICD-10-PCS | Mod: CPTII,S$GLB,, | Performed by: PHYSICIAN ASSISTANT

## 2022-06-29 PROCEDURE — U0002: ICD-10-PCS | Mod: QW,S$GLB,, | Performed by: PHYSICIAN ASSISTANT

## 2022-06-29 PROCEDURE — 99213 PR OFFICE/OUTPT VISIT, EST, LEVL III, 20-29 MIN: ICD-10-PCS | Mod: S$GLB,,, | Performed by: PHYSICIAN ASSISTANT

## 2022-06-29 PROCEDURE — 3077F SYST BP >= 140 MM HG: CPT | Mod: CPTII,S$GLB,, | Performed by: PHYSICIAN ASSISTANT

## 2022-06-29 RX ORDER — MUPIROCIN 20 MG/G
OINTMENT TOPICAL 3 TIMES DAILY
Qty: 15 G | Refills: 0 | Status: SHIPPED | OUTPATIENT
Start: 2022-06-29 | End: 2022-07-06

## 2022-06-29 NOTE — PROGRESS NOTES
"Subjective:       Patient ID: Kala Raya is a 31 y.o. female.    Vitals:  height is 5' 7" (1.702 m) and weight is 62.1 kg (137 lb). Her temperature is 97.8 °F (36.6 °C). Her blood pressure is 157/67 (abnormal) and her pulse is 98. Her respiration is 16 and oxygen saturation is 99%.     Chief Complaint: Sore Throat    Patient reports that she feels like something is stuck in her throat, left ear pain, and swollen lymph nodes.    Other  This is a new problem. The current episode started in the past 7 days. The problem occurs constantly. Associated symptoms include a sore throat. Pertinent negatives include no abdominal pain, chest pain, chills, congestion, coughing, fatigue, fever, headaches, myalgias, nausea, neck pain, rash or vomiting. Nothing aggravates the symptoms. She has tried nothing for the symptoms.       Constitution: Negative for chills, fatigue, fever and generalized weakness.   HENT: Positive for ear pain and sore throat. Negative for ear discharge, foreign body in ear, tinnitus, hearing loss, dental problem, drooling, mouth sores, tongue pain, tongue lesion, facial swelling, facial trauma, congestion, postnasal drip, sinus pain, sinus pressure and trouble swallowing.    Neck: Negative for neck pain, neck stiffness and painful lymph nodes.   Cardiovascular: Negative for chest pain, palpitations and sob on exertion.   Eyes: Negative for eye discharge, eye itching and eye pain.   Respiratory: Negative for cough, sputum production and shortness of breath.    Gastrointestinal: Negative for abdominal pain, nausea, vomiting and diarrhea.   Genitourinary: Negative for dysuria, hematuria and pelvic pain.   Musculoskeletal: Negative for pain, muscle cramps and muscle ache.   Skin: Negative for pale, rash and wound.   Neurological: Negative for dizziness, light-headedness and headaches.   Hematologic/Lymphatic: Negative for swollen lymph nodes.       Objective:      Physical Exam   Constitutional: She is " oriented to person, place, and time. She appears well-developed. She is cooperative.  Non-toxic appearance. She does not appear ill. No distress.   HENT:   Head: Normocephalic and atraumatic.   Ears:   Right Ear: External ear and ear canal normal. A middle ear effusion (clear) is present.   Left Ear: External ear and ear canal normal. A middle ear effusion (clear) is present.   Nose: Mucosal edema present. No rhinorrhea. Right sinus exhibits no maxillary sinus tenderness and no frontal sinus tenderness. Left sinus exhibits no maxillary sinus tenderness and no frontal sinus tenderness.   Mouth/Throat: Oropharynx is clear and moist and mucous membranes are normal. No oropharyngeal exudate, posterior oropharyngeal edema or posterior oropharyngeal erythema.   Eyes: Conjunctivae, EOM and lids are normal. Right eye exhibits no discharge. Left eye exhibits no discharge. No scleral icterus.   Neck: Trachea normal and phonation normal. Neck supple. Thyromegaly present. No thyroid mass present. No thyroid tenderness present.   Cardiovascular: Normal rate, regular rhythm and normal heart sounds.   No murmur heard.Exam reveals no gallop.   Pulmonary/Chest: Effort normal and breath sounds normal. No respiratory distress. She has no decreased breath sounds. She has no wheezes. She has no rhonchi. She has no rales.   Musculoskeletal:         General: No deformity.   Lymphadenopathy:        Head (right side): No submandibular and no tonsillar adenopathy present.        Head (left side): No submandibular and no tonsillar adenopathy present.   Neurological: She is alert and oriented to person, place, and time. Coordination normal.   Skin: Skin is warm, dry, intact, not diaphoretic and not pale.   Psychiatric: Her speech is normal and behavior is normal. Judgment and thought content normal.   Nursing note and vitals reviewed.        Assessment:       1. Fluid level behind tympanic membrane of both ears    2. Open wound of umbilical  region without complication, initial encounter        Office Visit on 06/29/2022   Component Date Value Ref Range Status    POC Rapid COVID 06/29/2022 Negative  Negative Final     Acceptable 06/29/2022 Yes   Final      Plan:       Pt states her mom has thyroid issues. Slight thyromegaly noted on exam. Advised patient f/u with PCP for evaluation. No nodules or TTP.  Fluid level behind tympanic membrane of both ears  -     POCT COVID-19 Rapid Screening    Open wound of umbilical region without complication, initial encounter  -     mupirocin (BACTROBAN) 2 % ointment; Apply topically 3 (three) times daily. for 7 days  Dispense: 15 g; Refill: 0      Patient Instructions   -Apply antibiotic ointment to affected area 3 times daily. Return for any worsening symptoms.    Below are suggestions for symptomatic relief:   -Tylenol every 4 hours OR ibuprofen every 6 hours as needed for pain/fever.   -Salt water gargles to soothe throat pain.   -Chloroseptic spray also helps to numb throat pain.   -Nasal saline spray reduces inflammation and dryness.   -Warm face compresses to help with facial sinus pain/pressure.   -Vicks vapor rub at night.   -Flonase OTC or Nasacort OTC for nasal congestion.   -Simple foods like chicken noodle soup.   -Delsym helps with coughing at night   -Zyrtec/Claritin during the day & Benadryl at night may help with allergies.     If you DO NOT have Hypertension or any history of palpitations, it is ok to take over the counter Sudafed or Mucinex D or Allegra-D or Claritin-D or Zyrtec-D.  If you do take one of the above, it is ok to combine that with plain over the counter Mucinex or Allegra or Claritin or Zyrtec. If, for example, you are taking Zyrtec -D, you can combine that with Mucinex, but not Mucinex-D.  If you are taking Mucinex-D, you can combine that with plain Allegra or Claritin or Zyrtec.   If you DO have Hypertension or palpitations, it is safe to take Coricidin HBP for relief  of sinus symptoms.    Please follow up with your primary care provider within 2-5 days if your signs and symptoms have not resolved or worsen.     If your condition worsens or fails to improve we recommend that you receive another evaluation at the emergency room immediately or contact your primary medical clinic to discuss your concerns.   You must understand that you have received an Urgent Care treatment only and that you may be released before all of your medical problems are known or treated. You, the patient, will arrange for follow up care as instructed.

## 2022-06-29 NOTE — PATIENT INSTRUCTIONS
-Apply antibiotic ointment to affected area 3 times daily. Return for any worsening symptoms.    Below are suggestions for symptomatic relief:   -Tylenol every 4 hours OR ibuprofen every 6 hours as needed for pain/fever.   -Salt water gargles to soothe throat pain.   -Chloroseptic spray also helps to numb throat pain.   -Nasal saline spray reduces inflammation and dryness.   -Warm face compresses to help with facial sinus pain/pressure.   -Vicks vapor rub at night.   -Flonase OTC or Nasacort OTC for nasal congestion.   -Simple foods like chicken noodle soup.   -Delsym helps with coughing at night   -Zyrtec/Claritin during the day & Benadryl at night may help with allergies.     If you DO NOT have Hypertension or any history of palpitations, it is ok to take over the counter Sudafed or Mucinex D or Allegra-D or Claritin-D or Zyrtec-D.  If you do take one of the above, it is ok to combine that with plain over the counter Mucinex or Allegra or Claritin or Zyrtec. If, for example, you are taking Zyrtec -D, you can combine that with Mucinex, but not Mucinex-D.  If you are taking Mucinex-D, you can combine that with plain Allegra or Claritin or Zyrtec.   If you DO have Hypertension or palpitations, it is safe to take Coricidin HBP for relief of sinus symptoms.    Please follow up with your primary care provider within 2-5 days if your signs and symptoms have not resolved or worsen.     If your condition worsens or fails to improve we recommend that you receive another evaluation at the emergency room immediately or contact your primary medical clinic to discuss your concerns.   You must understand that you have received an Urgent Care treatment only and that you may be released before all of your medical problems are known or treated. You, the patient, will arrange for follow up care as instructed.

## 2022-09-21 NOTE — PROGRESS NOTES
[facility]    Subjective:       Patient ID: Kala Raya is a 31 y.o. female.    Chief Complaint: Establish Care      History was obtained from the patient and supplemented through chart review.  This patient has not been seen by an [facility] internal medicine physician in the past 3 years and is new to me.    HPI:    Patient is a 31 y.o. female who presents to establish care.    Smoking   1 ppd x 14 years, almost 15  Dad smokes, working on quitting  Brother used to smoke  Some SOB while running with her dog  Has quit twice for a month each time  Agreed to smoking cessation referral, placed  Sometimes doesn't smoke when having trouble sleeping    2 glasses of wine a day  Tries to take a day off    Stressful job w/ [employer] job 50-60 work weeks  Nyquil sometimes to help sleep  Sometimes     Lower back pain  Scoliosis  Joints crack more than average    Prior fainting  Less frequently, but occasionally pre-syncope    Works in [employer] office    Medical History  Past Medical History:   Diagnosis Date    No pertinent past medical history        Review of Systems   Constitutional:  Negative for fever.   HENT:  Negative for trouble swallowing.    Respiratory:  Negative for shortness of breath.    Cardiovascular:  Negative for chest pain.   Gastrointestinal:  Negative for constipation, diarrhea, nausea and vomiting.   Musculoskeletal:  Negative for gait problem.   Neurological:  Negative for dizziness and seizures.   Psychiatric/Behavioral:  Positive for sleep disturbance (sometimes). Negative for hallucinations.         High stress       Surgical hx, family hx, social hx   Have been reviewed      Current Outpatient Medications:     ibuprofen (ADVIL,MOTRIN) 200 MG tablet, Take 200 mg by mouth every 6 (six) hours as needed for Pain., Disp: , Rfl:     desogestreL-ethinyl estradioL (ISIBLOOM) 0.15-0.03 mg per tablet, Take 1 tablet by mouth once daily., Disp: 90 tablet, Rfl: 4    Objective:        Body mass index is 20.68  "kg/m².  Vitals:    09/22/22 1056   BP: 120/72   Pulse: 88   SpO2: 99%   Weight: 59.9 kg (132 lb 0.9 oz)   Height: 5' 7" (1.702 m)   PainSc: 0-No pain     Physical Exam  Vitals and nursing note reviewed.   Constitutional:       General: She is not in acute distress.     Appearance: Normal appearance. She is not ill-appearing.   HENT:      Head: Normocephalic and atraumatic.      Nose:      Comments: Wearing a mask  Eyes:      General: No scleral icterus.  Cardiovascular:      Rate and Rhythm: Normal rate and regular rhythm.      Heart sounds: Normal heart sounds.   Pulmonary:      Effort: Pulmonary effort is normal.   Abdominal:      General: There is no distension.   Musculoskeletal:         General: No deformity.      Cervical back: Normal range of motion.   Skin:     General: Skin is warm and dry.   Neurological:      Mental Status: She is alert and oriented to person, place, and time.   Psychiatric:         Behavior: Behavior normal.         No results found for: WBC, HGB, HCT, PLT, CHOL, TRIG, HDL, LDLDIRECT, ALT, AST, NA, K, CL, CREATININE, BUN, CO2, TSH, PSA, INR, GLUF, HGBA1C, MICROALBUR    The ASCVD Risk score (Codi DK, et al., 2019) failed to calculate for the following reasons:    The 2019 ASCVD risk score is only valid for ages 40 to 79    (Imaging have been independently reviewed)  Xray shoulder normal 1/28/2022    Assessment:         1. Annual physical exam    2. Breast cyst, left    3. Screening examination for STD (sexually transmitted disease)    4. Establishing care with new doctor, encounter for    5. Pre-syncope    6. Insomnia, unspecified type    7. Tobacco dependence          Plan:     Kala was seen today for establish care.    Diagnoses and all orders for this visit:    Annual physical exam  -     Ambulatory referral/consult to Family Practice  -     Lipid Panel; Future  -     TSH; Future  -     COMPREHENSIVE METABOLIC PANEL; Future  -     CBC Auto Differential; Future  -     Vitamin B12; " Future  -     Vitamin D; Future  -     IRON AND TIBC; Future  -     FERRITIN; Future    Breast cyst, left    Screening examination for STD (sexually transmitted disease)  -     RPR; Future  -     HIV 1/2 Ag/Ab (4th Gen); Future  -     C. trachomatis/N. gonorrhoeae by AMP DNA [facility]; Urine; Future    Establishing care with new doctor, encounter for    Pre-syncope  -     Vitamin B12; Future  -     Vitamin D; Future  -     IRON AND TIBC; Future  -     FERRITIN; Future    Insomnia, unspecified type  -     Vitamin B12; Future  -     Vitamin D; Future  -     IRON AND TIBC; Future  -     FERRITIN; Future    Tobacco dependence  -     Ambulatory referral/consult to Smoking Cessation Program; Future      Health Maintenance  - Lipids: ordered  - A1C: fasting glucose  - Colon Ca Screen: na  - Immunizations: covid vaccinated and boosted; tetanus 2018    Women's health  - Pap: NILM 5/20/2022, HPV positive, repeat in 1 year  - Mammo: na  - Dexa: na  - Contraception: OCP     Follow up in about 2 weeks (around 10/6/2022) for lab review. or sooner prn        All medications were reviewed including potential side effects and risks/benefits.  Pt was counseled to call back if anything worsens or if questions arise.        Williams Ya MD  Family Medicine  [facility]  [address]  [address]  [place] [address]  Phone [phone]  Fax [phone]

## 2022-09-22 ENCOUNTER — LAB VISIT (OUTPATIENT)
Dept: LAB | Facility: OTHER | Age: 32
End: 2022-09-22
Attending: STUDENT IN AN ORGANIZED HEALTH CARE EDUCATION/TRAINING PROGRAM
Payer: COMMERCIAL

## 2022-09-22 ENCOUNTER — OFFICE VISIT (OUTPATIENT)
Dept: INTERNAL MEDICINE | Facility: CLINIC | Age: 32
End: 2022-09-22
Payer: COMMERCIAL

## 2022-09-22 VITALS
SYSTOLIC BLOOD PRESSURE: 120 MMHG | HEART RATE: 88 BPM | BODY MASS INDEX: 20.73 KG/M2 | OXYGEN SATURATION: 99 % | DIASTOLIC BLOOD PRESSURE: 72 MMHG | HEIGHT: 67 IN | WEIGHT: 132.06 LBS

## 2022-09-22 DIAGNOSIS — R55 PRE-SYNCOPE: ICD-10-CM

## 2022-09-22 DIAGNOSIS — Z00.00 ANNUAL PHYSICAL EXAM: ICD-10-CM

## 2022-09-22 DIAGNOSIS — Z11.3 SCREENING EXAMINATION FOR STD (SEXUALLY TRANSMITTED DISEASE): ICD-10-CM

## 2022-09-22 DIAGNOSIS — G47.00 INSOMNIA, UNSPECIFIED TYPE: ICD-10-CM

## 2022-09-22 DIAGNOSIS — F17.200 TOBACCO DEPENDENCE: ICD-10-CM

## 2022-09-22 DIAGNOSIS — E53.8 B12 DEFICIENCY: Primary | ICD-10-CM

## 2022-09-22 DIAGNOSIS — Z76.89 ESTABLISHING CARE WITH NEW DOCTOR, ENCOUNTER FOR: ICD-10-CM

## 2022-09-22 DIAGNOSIS — Z00.00 ANNUAL PHYSICAL EXAM: Primary | ICD-10-CM

## 2022-09-22 DIAGNOSIS — N60.02 BREAST CYST, LEFT: ICD-10-CM

## 2022-09-22 LAB
25(OH)D3+25(OH)D2 SERPL-MCNC: 39 NG/ML (ref 30–96)
ALBUMIN SERPL BCP-MCNC: 4 G/DL (ref 3.5–5.2)
ALP SERPL-CCNC: 56 U/L (ref 55–135)
ALT SERPL W/O P-5'-P-CCNC: 10 U/L (ref 10–44)
ANION GAP SERPL CALC-SCNC: 8 MMOL/L (ref 8–16)
AST SERPL-CCNC: 14 U/L (ref 10–40)
BASOPHILS # BLD AUTO: 0.05 K/UL (ref 0–0.2)
BASOPHILS NFR BLD: 0.7 % (ref 0–1.9)
BILIRUB SERPL-MCNC: 0.3 MG/DL (ref 0.1–1)
BUN SERPL-MCNC: 10 MG/DL (ref 6–20)
CALCIUM SERPL-MCNC: 9.3 MG/DL (ref 8.7–10.5)
CHLORIDE SERPL-SCNC: 107 MMOL/L (ref 95–110)
CHOLEST SERPL-MCNC: 157 MG/DL (ref 120–199)
CHOLEST/HDLC SERPL: 2.9 {RATIO} (ref 2–5)
CO2 SERPL-SCNC: 23 MMOL/L (ref 23–29)
CREAT SERPL-MCNC: 0.7 MG/DL (ref 0.5–1.4)
DIFFERENTIAL METHOD: ABNORMAL
EOSINOPHIL # BLD AUTO: 0.1 K/UL (ref 0–0.5)
EOSINOPHIL NFR BLD: 1.6 % (ref 0–8)
ERYTHROCYTE [DISTWIDTH] IN BLOOD BY AUTOMATED COUNT: 11.1 % (ref 11.5–14.5)
EST. GFR  (NO RACE VARIABLE): >60 ML/MIN/1.73 M^2
FERRITIN SERPL-MCNC: 58 NG/ML (ref 20–300)
GLUCOSE SERPL-MCNC: 77 MG/DL (ref 70–110)
HCT VFR BLD AUTO: 39.1 % (ref 37–48.5)
HDLC SERPL-MCNC: 55 MG/DL (ref 40–75)
HDLC SERPL: 35 % (ref 20–50)
HGB BLD-MCNC: 13.7 G/DL (ref 12–16)
HIV 1+2 AB+HIV1 P24 AG SERPL QL IA: NORMAL
IMM GRANULOCYTES # BLD AUTO: 0.02 K/UL (ref 0–0.04)
IMM GRANULOCYTES NFR BLD AUTO: 0.3 % (ref 0–0.5)
IRON SERPL-MCNC: 123 UG/DL (ref 30–160)
LDLC SERPL CALC-MCNC: 83.2 MG/DL (ref 63–159)
LYMPHOCYTES # BLD AUTO: 3.2 K/UL (ref 1–4.8)
LYMPHOCYTES NFR BLD: 41.7 % (ref 18–48)
MCH RBC QN AUTO: 33.4 PG (ref 27–31)
MCHC RBC AUTO-ENTMCNC: 35 G/DL (ref 32–36)
MCV RBC AUTO: 95 FL (ref 82–98)
MONOCYTES # BLD AUTO: 0.6 K/UL (ref 0.3–1)
MONOCYTES NFR BLD: 7.4 % (ref 4–15)
NEUTROPHILS # BLD AUTO: 3.7 K/UL (ref 1.8–7.7)
NEUTROPHILS NFR BLD: 48.3 % (ref 38–73)
NONHDLC SERPL-MCNC: 102 MG/DL
NRBC BLD-RTO: 0 /100 WBC
PLATELET # BLD AUTO: 275 K/UL (ref 150–450)
PMV BLD AUTO: 9.3 FL (ref 9.2–12.9)
POTASSIUM SERPL-SCNC: 3.8 MMOL/L (ref 3.5–5.1)
PROT SERPL-MCNC: 7.2 G/DL (ref 6–8.4)
RBC # BLD AUTO: 4.1 M/UL (ref 4–5.4)
SATURATED IRON: 26 % (ref 20–50)
SODIUM SERPL-SCNC: 138 MMOL/L (ref 136–145)
TOTAL IRON BINDING CAPACITY: 477 UG/DL (ref 250–450)
TRANSFERRIN SERPL-MCNC: 322 MG/DL (ref 200–375)
TRIGL SERPL-MCNC: 94 MG/DL (ref 30–150)
TSH SERPL DL<=0.005 MIU/L-ACNC: 0.56 UIU/ML (ref 0.4–4)
VIT B12 SERPL-MCNC: 181 PG/ML (ref 210–950)
WBC # BLD AUTO: 7.67 K/UL (ref 3.9–12.7)

## 2022-09-22 PROCEDURE — 1159F PR MEDICATION LIST DOCUMENTED IN MEDICAL RECORD: ICD-10-PCS | Mod: CPTII,S$GLB,, | Performed by: STUDENT IN AN ORGANIZED HEALTH CARE EDUCATION/TRAINING PROGRAM

## 2022-09-22 PROCEDURE — 99999 PR PBB SHADOW E&M-EST. PATIENT-LVL IV: ICD-10-PCS | Mod: PBBFAC,,, | Performed by: STUDENT IN AN ORGANIZED HEALTH CARE EDUCATION/TRAINING PROGRAM

## 2022-09-22 PROCEDURE — 3008F BODY MASS INDEX DOCD: CPT | Mod: CPTII,S$GLB,, | Performed by: STUDENT IN AN ORGANIZED HEALTH CARE EDUCATION/TRAINING PROGRAM

## 2022-09-22 PROCEDURE — 80061 LIPID PANEL: CPT | Performed by: STUDENT IN AN ORGANIZED HEALTH CARE EDUCATION/TRAINING PROGRAM

## 2022-09-22 PROCEDURE — 80053 COMPREHEN METABOLIC PANEL: CPT | Performed by: STUDENT IN AN ORGANIZED HEALTH CARE EDUCATION/TRAINING PROGRAM

## 2022-09-22 PROCEDURE — 84443 ASSAY THYROID STIM HORMONE: CPT | Performed by: STUDENT IN AN ORGANIZED HEALTH CARE EDUCATION/TRAINING PROGRAM

## 2022-09-22 PROCEDURE — 86592 SYPHILIS TEST NON-TREP QUAL: CPT | Performed by: STUDENT IN AN ORGANIZED HEALTH CARE EDUCATION/TRAINING PROGRAM

## 2022-09-22 PROCEDURE — 99385 PR PREVENTIVE VISIT,NEW,18-39: ICD-10-PCS | Mod: S$GLB,,, | Performed by: STUDENT IN AN ORGANIZED HEALTH CARE EDUCATION/TRAINING PROGRAM

## 2022-09-22 PROCEDURE — 82728 ASSAY OF FERRITIN: CPT | Performed by: STUDENT IN AN ORGANIZED HEALTH CARE EDUCATION/TRAINING PROGRAM

## 2022-09-22 PROCEDURE — 3074F PR MOST RECENT SYSTOLIC BLOOD PRESSURE < 130 MM HG: ICD-10-PCS | Mod: CPTII,S$GLB,, | Performed by: STUDENT IN AN ORGANIZED HEALTH CARE EDUCATION/TRAINING PROGRAM

## 2022-09-22 PROCEDURE — 3078F DIAST BP <80 MM HG: CPT | Mod: CPTII,S$GLB,, | Performed by: STUDENT IN AN ORGANIZED HEALTH CARE EDUCATION/TRAINING PROGRAM

## 2022-09-22 PROCEDURE — 82607 VITAMIN B-12: CPT | Performed by: STUDENT IN AN ORGANIZED HEALTH CARE EDUCATION/TRAINING PROGRAM

## 2022-09-22 PROCEDURE — 1159F MED LIST DOCD IN RCRD: CPT | Mod: CPTII,S$GLB,, | Performed by: STUDENT IN AN ORGANIZED HEALTH CARE EDUCATION/TRAINING PROGRAM

## 2022-09-22 PROCEDURE — 3008F PR BODY MASS INDEX (BMI) DOCUMENTED: ICD-10-PCS | Mod: CPTII,S$GLB,, | Performed by: STUDENT IN AN ORGANIZED HEALTH CARE EDUCATION/TRAINING PROGRAM

## 2022-09-22 PROCEDURE — 85025 COMPLETE CBC W/AUTO DIFF WBC: CPT | Performed by: STUDENT IN AN ORGANIZED HEALTH CARE EDUCATION/TRAINING PROGRAM

## 2022-09-22 PROCEDURE — 3078F PR MOST RECENT DIASTOLIC BLOOD PRESSURE < 80 MM HG: ICD-10-PCS | Mod: CPTII,S$GLB,, | Performed by: STUDENT IN AN ORGANIZED HEALTH CARE EDUCATION/TRAINING PROGRAM

## 2022-09-22 PROCEDURE — 82306 VITAMIN D 25 HYDROXY: CPT | Performed by: STUDENT IN AN ORGANIZED HEALTH CARE EDUCATION/TRAINING PROGRAM

## 2022-09-22 PROCEDURE — 87389 HIV-1 AG W/HIV-1&-2 AB AG IA: CPT | Performed by: STUDENT IN AN ORGANIZED HEALTH CARE EDUCATION/TRAINING PROGRAM

## 2022-09-22 PROCEDURE — 99999 PR PBB SHADOW E&M-EST. PATIENT-LVL IV: CPT | Mod: PBBFAC,,, | Performed by: STUDENT IN AN ORGANIZED HEALTH CARE EDUCATION/TRAINING PROGRAM

## 2022-09-22 PROCEDURE — 84466 ASSAY OF TRANSFERRIN: CPT | Performed by: STUDENT IN AN ORGANIZED HEALTH CARE EDUCATION/TRAINING PROGRAM

## 2022-09-22 PROCEDURE — 36415 COLL VENOUS BLD VENIPUNCTURE: CPT | Performed by: STUDENT IN AN ORGANIZED HEALTH CARE EDUCATION/TRAINING PROGRAM

## 2022-09-22 PROCEDURE — 99385 PREV VISIT NEW AGE 18-39: CPT | Mod: S$GLB,,, | Performed by: STUDENT IN AN ORGANIZED HEALTH CARE EDUCATION/TRAINING PROGRAM

## 2022-09-22 PROCEDURE — 3074F SYST BP LT 130 MM HG: CPT | Mod: CPTII,S$GLB,, | Performed by: STUDENT IN AN ORGANIZED HEALTH CARE EDUCATION/TRAINING PROGRAM

## 2022-09-22 RX ORDER — LANOLIN ALCOHOL/MO/W.PET/CERES
1000 CREAM (GRAM) TOPICAL DAILY
Qty: 90 TABLET | Refills: 1 | Status: SHIPPED | OUTPATIENT
Start: 2022-09-22 | End: 2023-07-13

## 2022-09-23 ENCOUNTER — TELEPHONE (OUTPATIENT)
Dept: INTERNAL MEDICINE | Facility: CLINIC | Age: 32
End: 2022-09-23
Payer: COMMERCIAL

## 2022-09-23 LAB — RPR SER QL: NORMAL

## 2022-09-23 NOTE — TELEPHONE ENCOUNTER
"Call pt to review most recent lab results with her. She doesn't answer. I leave her a vm.     Msg:   Please schedule b12 level in approx a month  "Your blood work looks good, besides low B12.     Let's get you on a over-the-counter supplement for a while (insurance doesn't cover it prescribed- 1,000 mcg daily), we'll recheck in about a month and start injections if needed.   This is good news.  We can maybe make you feel a little better and we we caught it before it started causing you major issues. Otherwise, labs look pretty good.      Please contact me if you have any additional concerns."     "

## 2022-09-23 NOTE — TELEPHONE ENCOUNTER
"Spoke to Ms. Elver and informed her per Dr. Ya that Msg:   Please schedule b12 level in approx a month  "Your blood work looks good, besides low B12.     Let's get you on a over-the-counter supplement for a while (insurance doesn't cover it prescribed- 1,000 mcg daily), we'll recheck in about a month and start injections if needed.   This is good news.  We can maybe make you feel a little better and we we caught it before it started causing you major issues. Otherwise, labs look pretty good.       Please contact me if you have any additional concerns    Patient states understanding and confirmed appt date and time.    "

## 2022-09-23 NOTE — TELEPHONE ENCOUNTER
----- Message from Williams Ya MD sent at 9/22/2022  9:45 PM CDT -----  Please schedule b12 level in approx a month

## 2022-09-23 NOTE — TELEPHONE ENCOUNTER
----- Message from Jose Reese sent at 9/23/2022  3:59 PM CDT -----  Regarding: Results  Contact: ZAIN CERON [[id number]]  Type:  Patient Returning Call    Who Called: ZAIN CERON [[id number]]    Who Left Message for Patient: Ismael    Does the patient know what this is regarding?: yes    Would the patient rather a call back or a response via [facility]? call    Best Call Back Number: [phone]    Additional Information:

## 2022-11-16 ENCOUNTER — TELEPHONE (OUTPATIENT)
Dept: INTERNAL MEDICINE | Facility: CLINIC | Age: 32
End: 2022-11-16
Payer: COMMERCIAL

## 2022-11-16 NOTE — TELEPHONE ENCOUNTER
----- Message from Amelia Kincaid sent at 11/16/2022  7:40 AM CST -----  Pt is requesting thyroid u/s per last visit.

## 2022-12-06 ENCOUNTER — LAB VISIT (OUTPATIENT)
Dept: LAB | Facility: OTHER | Age: 32
End: 2022-12-06
Attending: OBSTETRICS & GYNECOLOGY
Payer: COMMERCIAL

## 2022-12-06 DIAGNOSIS — E53.8 B12 DEFICIENCY: ICD-10-CM

## 2022-12-06 DIAGNOSIS — Z11.3 SCREEN FOR STD (SEXUALLY TRANSMITTED DISEASE): ICD-10-CM

## 2022-12-06 LAB
HBV SURFACE AG SERPL QL IA: NORMAL
HCV AB SERPL QL IA: NORMAL
HIV 1+2 AB+HIV1 P24 AG SERPL QL IA: NORMAL
RPR SER QL: NORMAL
VIT B12 SERPL-MCNC: 332 PG/ML (ref 210–950)

## 2022-12-06 PROCEDURE — 86803 HEPATITIS C AB TEST: CPT | Performed by: OBSTETRICS & GYNECOLOGY

## 2022-12-06 PROCEDURE — 87340 HEPATITIS B SURFACE AG IA: CPT | Performed by: OBSTETRICS & GYNECOLOGY

## 2022-12-06 PROCEDURE — 36415 COLL VENOUS BLD VENIPUNCTURE: CPT | Performed by: OBSTETRICS & GYNECOLOGY

## 2022-12-06 PROCEDURE — 86592 SYPHILIS TEST NON-TREP QUAL: CPT | Performed by: OBSTETRICS & GYNECOLOGY

## 2022-12-06 PROCEDURE — 82607 VITAMIN B-12: CPT | Performed by: STUDENT IN AN ORGANIZED HEALTH CARE EDUCATION/TRAINING PROGRAM

## 2022-12-06 PROCEDURE — 87389 HIV-1 AG W/HIV-1&-2 AB AG IA: CPT | Performed by: OBSTETRICS & GYNECOLOGY

## 2023-05-02 ENCOUNTER — TELEPHONE (OUTPATIENT)
Dept: OBSTETRICS AND GYNECOLOGY | Facility: CLINIC | Age: 33
End: 2023-05-02
Payer: COMMERCIAL

## 2023-05-02 NOTE — TELEPHONE ENCOUNTER
Called pt to schedule annual appt. No answer. LVM.  ----- Message from Aidan Oneil MD sent at 5/2/2023  1:13 PM CDT -----  Please contact her to set up annual exam sometime in June.    Aidan Oneil    ----- Message -----  From: Aidan Oneil MD  Sent: 5/2/2023  12:00 AM CDT  To: Aidan Oneil MD    See if she scheduled pap with HPV

## 2023-05-04 ENCOUNTER — PATIENT MESSAGE (OUTPATIENT)
Dept: OBSTETRICS AND GYNECOLOGY | Facility: CLINIC | Age: 33
End: 2023-05-04
Payer: COMMERCIAL

## 2023-05-29 ENCOUNTER — OFFICE VISIT (OUTPATIENT)
Dept: SPORTS MEDICINE | Facility: CLINIC | Age: 33
End: 2023-05-29
Payer: COMMERCIAL

## 2023-05-29 ENCOUNTER — HOSPITAL ENCOUNTER (OUTPATIENT)
Dept: RADIOLOGY | Facility: HOSPITAL | Age: 33
Discharge: HOME | End: 2023-05-29
Attending: ORTHOPAEDIC SURGERY
Payer: COMMERCIAL

## 2023-05-29 VITALS — WEIGHT: 135 LBS | HEIGHT: 67 IN | BODY MASS INDEX: 21.19 KG/M2

## 2023-05-29 DIAGNOSIS — M25.512 BILATERAL SHOULDER PAIN, UNSPECIFIED CHRONICITY: ICD-10-CM

## 2023-05-29 DIAGNOSIS — M25.312 INSTABILITY OF BOTH SHOULDER JOINTS: Primary | ICD-10-CM

## 2023-05-29 DIAGNOSIS — M25.511 BILATERAL SHOULDER PAIN, UNSPECIFIED CHRONICITY: ICD-10-CM

## 2023-05-29 DIAGNOSIS — M25.311 INSTABILITY OF BOTH SHOULDER JOINTS: Primary | ICD-10-CM

## 2023-05-29 DIAGNOSIS — M25.551 RIGHT HIP PAIN: ICD-10-CM

## 2023-05-29 PROCEDURE — 99214 OFFICE O/P EST MOD 30 MIN: CPT | Mod: S$GLB,,, | Performed by: ORTHOPAEDIC SURGERY

## 2023-05-29 PROCEDURE — 99999 PR PBB SHADOW E&M-EST. PATIENT-LVL III: ICD-10-PCS | Mod: PBBFAC,,, | Performed by: ORTHOPAEDIC SURGERY

## 2023-05-29 PROCEDURE — 3008F PR BODY MASS INDEX (BMI) DOCUMENTED: ICD-10-PCS | Mod: CPTII,S$GLB,, | Performed by: ORTHOPAEDIC SURGERY

## 2023-05-29 PROCEDURE — 1159F PR MEDICATION LIST DOCUMENTED IN MEDICAL RECORD: ICD-10-PCS | Mod: CPTII,S$GLB,, | Performed by: ORTHOPAEDIC SURGERY

## 2023-05-29 PROCEDURE — 99999 PR PBB SHADOW E&M-EST. PATIENT-LVL III: CPT | Mod: PBBFAC,,, | Performed by: ORTHOPAEDIC SURGERY

## 2023-05-29 PROCEDURE — 1159F MED LIST DOCD IN RCRD: CPT | Mod: CPTII,S$GLB,, | Performed by: ORTHOPAEDIC SURGERY

## 2023-05-29 PROCEDURE — 3008F BODY MASS INDEX DOCD: CPT | Mod: CPTII,S$GLB,, | Performed by: ORTHOPAEDIC SURGERY

## 2023-05-29 PROCEDURE — 99214 PR OFFICE/OUTPT VISIT, EST, LEVL IV, 30-39 MIN: ICD-10-PCS | Mod: S$GLB,,, | Performed by: ORTHOPAEDIC SURGERY

## 2023-05-29 NOTE — PROGRESS NOTES
"ESTABLISHED PATIENT    History 5/29/2023:  Kala returns to clinic today for follow-up of bilateral shoulder pain. She states that her right shoulder is starting to bother her like her left shoulder. She reports her right shoulder coming out of place since last visit. She was going to physical therapy and is interested in continuing that. She also reports some right hip pain today.    History 1/28/2022:   32 y.o. 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 She has a dog that keeps her busy. 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 She states that she feels like the shoulder dislocates. She states she also has neck pain.   She states her left shoulder has slipped out about 3 times.  She states she is feeling get unstable in her sleep.      - mechanical symptoms, + instability    Is affecting ADLs.  Pain is 0/10 at it's worst.    REVIEW OF SYSTEMS   Constitution: Negative. Negative for chills, fever and night sweats.   HENT: Negative for congestion and headaches.    Eyes: Negative for blurred vision, left vision loss and right vision loss.   Cardiovascular: Negative for chest pain and syncope.   Respiratory: Negative for cough and shortness of breath.    Endocrine: Negative for polydipsia, polyphagia and polyuria.   Hematologic/Lymphatic: Negative for bleeding problem. Does not bruise/bleed easily.   Skin: Negative for dry skin, itching and rash.   Musculoskeletal: Negative for falls. Positive for left shoulder pain  Gastrointestinal: Negative for abdominal pain and bowel incontinence.   Genitourinary: Negative for bladder incontinence and nocturia.   Neurological: Negative for disturbances in coordination, loss of balance and seizures.   Psychiatric/Behavioral: Negative for depression. The patient does not have insomnia.    Allergic/Immunologic: Negative for hives and persistent infections.     PHYSICAL EXAMINATION    Vitals: Ht 5' 7" (1.702 m)   Wt 61.2 kg (135 lb)   " BMI 21.14 kg/m²     General: The patient appears active and healthy with no apparent physical problems.  No disturbance of mood or affect is demonstrated. Alert and Oriented.    HEENT: Eyes normal, pupils equally round, nose normal.    Resp: Equal and symmetrical chest rises. No wheezing    CV: Regular rate    Neck: Supple; nonpainful range of motion.    Extremities: no cyanosis, clubbing, edema, or diffuse swelling.  Palpable pulses, good capillary refill of the digits.  No coolness, discoloration, edema or obvious varicosities.    Skin: no lesions noted.    Lymphatic: no detected adenopathy in the upper or lower extremities.    Neurologic: normal mental status, normal reflexes, normal gait and balance.  Patient is alert and oriented to person, place and time.  No flaccidity or spasticity is noted.  No motor or sensory deficits are noted.  Light touch is intact    Orthopaedic:     SHOULDER EXAM - LEFT    Inspection:   Normal skin color and appearance with no scars.  No muscle atrophy noted.  No scapular winging.      Palpation: No tenderness of the acromioclavicular joint, lateral edge of the acromion, biceps tendon, trapezius muscle or scapulothoracic bursa.      ROM:      PROM:     FE - 180°    Abd/ER -  90°  Abd/IR -  45°   Add/ER -  90°     AROM:    FE - 180°    Abd/ER -  90°  Abd/IR -  45°   Add/ER -  90    Tests:     - Aguirre, - Neer's, - Cross Arm Adduction, - Adah, - Yerguson, - Speed. - Belly Press,  - Jobes, - Lift Off    Stability: + sulcus, - apprehension, - relocation, - load and shift, - DLS  2+ anterior 2+ inferior and 3+ posterior load and shift    Motor:  Rotator cuff strength is 5/5 supraspinatus, 5/5 infraspinatus, 5/5 subscapularis. Biceps, triceps and deltoid strength is 5/5.      Neuro     Distally there are no paresthesias, and sensation is intact to light touch in the median, ulnar, and radial distributions.  Reflexes are 2/2 biceps, triceps and brachioradialis.      Beighton scale:  6/9    SHOULDER EXAM - RIGHT    Inspection:   Normal skin color and appearance with no scars.  No muscle atrophy noted.  No scapular winging.      Palpation: No tenderness of the acromioclavicular joint, lateral edge of the acromion, biceps tendon, trapezius muscle or scapulothoracic bursa.      ROM:      PROM:     FE - 180°    Abd/ER -  90°  Abd/IR -  45°   Add/ER -  80°     AROM:    FE - 180°    Abd/ER -  90°  Abd/IR -  45°   Add/ER -  80° 150 degrees hyper abduction         Tests:     - Aguirre, - Neer's, - Cross Arm Adduction, + Ashley, - Yerguson, - Speed. - Belly Press,  - Jobes, - Lift Off    Stability: + sulcus, - apprehension, - relocation, - load and shift, - DLS  2+ anterior 2+ inferior and 3+ posterior load and shift    Motor:  Rotator cuff strength is 5/5 supraspinatus, 5/5 infraspinatus, 5/5 subscapularis. Biceps, triceps and deltoid strength is 5/5.      Neuro     Distally there are no paresthesias, and sensation is intact to light touch in the median, ulnar, and radial distributions.  Reflexes are 2/2 biceps, triceps and brachioradialis.      IMAGING    X-Ray Shoulder 2 or More Views Left  Narrative: EXAMINATION:  XR SHOULDER COMPLETE 2 OR MORE VIEWS LEFT    CLINICAL HISTORY:  Pain in left shoulder    TECHNIQUE:  Two or three views of the left shoulder were performed.    COMPARISON:  None    FINDINGS:  Glenohumeral joint space is normal.  Bony structures are intact.  No soft tissue calcification is seen.  Impression: Normal findings    Electronically signed by: Kiel Burgess MD  Date:    01/28/2022  Time:    10:26      IMPRESSION       ICD-10-CM ICD-9-CM   1. Instability of both shoulder joints  M25.311 718.81    M25.312    2. Bilateral shoulder pain, unspecified chronicity  M25.511 719.41    M25.512    3. Right hip pain  M25.551 719.45       MEDICATIONS PRESCRIBED      None    RECOMMENDATIONS     Referral to physical therapy placed today  RTC in 6 weeks  We had a long discussion concerning  surgical and nonsurgical treatments for multidirectional instability.  Considering she has had another dislocation but was able to self reduce, we did discuss potentially obtaining an MR arthrogram.  She wants to try some physical therapy at this point to see if she can get this feeling better again.  I will see her back in 6 weeks.  If at that time she is not feeling better we will order the MR arthrogram to evaluate for not only her capsular laxity but a possible labral injury as well as capsular injury despite having multidirectional instability.      All questions were answered, pt will contact us for questions or concerns in the interim.

## 2023-06-15 ENCOUNTER — PATIENT MESSAGE (OUTPATIENT)
Dept: DERMATOLOGY | Facility: CLINIC | Age: 33
End: 2023-06-15

## 2023-06-30 ENCOUNTER — PATIENT MESSAGE (OUTPATIENT)
Dept: SPORTS MEDICINE | Facility: CLINIC | Age: 33
End: 2023-06-30
Payer: COMMERCIAL

## 2023-07-12 ENCOUNTER — TELEPHONE (OUTPATIENT)
Dept: OBSTETRICS AND GYNECOLOGY | Facility: CLINIC | Age: 33
End: 2023-07-12
Payer: COMMERCIAL

## 2023-07-12 NOTE — TELEPHONE ENCOUNTER
Called pt in regards to left message. No answer. LVM  ----- Message from Dana Singletary sent at 7/12/2023 10:35 AM CDT -----  Patient Kala Raya is schedule to come in on 07/13 for her Annual but  patient states that her period   came down. And is trying to find out can still come in because she also is needing her birth control.  If not can she reschedule to another day and still her birth control because she is out. Patient would   Like for someone to give her a call In regards to this matter.  Contact#[phone]                                                    Thank You!!!!

## 2023-07-13 ENCOUNTER — OFFICE VISIT (OUTPATIENT)
Dept: OBSTETRICS AND GYNECOLOGY | Facility: CLINIC | Age: 33
End: 2023-07-13
Payer: COMMERCIAL

## 2023-07-13 VITALS
HEIGHT: 67 IN | WEIGHT: 136.88 LBS | SYSTOLIC BLOOD PRESSURE: 114 MMHG | DIASTOLIC BLOOD PRESSURE: 70 MMHG | BODY MASS INDEX: 21.48 KG/M2

## 2023-07-13 DIAGNOSIS — Z30.41 ORAL CONTRACEPTIVE PILL SURVEILLANCE: ICD-10-CM

## 2023-07-13 DIAGNOSIS — Z12.4 PAP SMEAR FOR CERVICAL CANCER SCREENING: ICD-10-CM

## 2023-07-13 DIAGNOSIS — Z00.00 ANNUAL PHYSICAL EXAM: Primary | ICD-10-CM

## 2023-07-13 PROCEDURE — 3074F PR MOST RECENT SYSTOLIC BLOOD PRESSURE < 130 MM HG: ICD-10-PCS | Mod: CPTII,S$GLB,, | Performed by: OBSTETRICS & GYNECOLOGY

## 2023-07-13 PROCEDURE — 1160F PR REVIEW ALL MEDS BY PRESCRIBER/CLIN PHARMACIST DOCUMENTED: ICD-10-PCS | Mod: CPTII,S$GLB,, | Performed by: OBSTETRICS & GYNECOLOGY

## 2023-07-13 PROCEDURE — 3074F SYST BP LT 130 MM HG: CPT | Mod: CPTII,S$GLB,, | Performed by: OBSTETRICS & GYNECOLOGY

## 2023-07-13 PROCEDURE — 3008F PR BODY MASS INDEX (BMI) DOCUMENTED: ICD-10-PCS | Mod: CPTII,S$GLB,, | Performed by: OBSTETRICS & GYNECOLOGY

## 2023-07-13 PROCEDURE — 99395 PR PREVENTIVE VISIT,EST,18-39: ICD-10-PCS | Mod: S$GLB,,, | Performed by: OBSTETRICS & GYNECOLOGY

## 2023-07-13 PROCEDURE — 1159F PR MEDICATION LIST DOCUMENTED IN MEDICAL RECORD: ICD-10-PCS | Mod: CPTII,S$GLB,, | Performed by: OBSTETRICS & GYNECOLOGY

## 2023-07-13 PROCEDURE — 1160F RVW MEDS BY RX/DR IN RCRD: CPT | Mod: CPTII,S$GLB,, | Performed by: OBSTETRICS & GYNECOLOGY

## 2023-07-13 PROCEDURE — 87624 HPV HI-RISK TYP POOLED RSLT: CPT | Performed by: OBSTETRICS & GYNECOLOGY

## 2023-07-13 PROCEDURE — 3078F DIAST BP <80 MM HG: CPT | Mod: CPTII,S$GLB,, | Performed by: OBSTETRICS & GYNECOLOGY

## 2023-07-13 PROCEDURE — 88141 CYTOPATH C/V INTERPRET: CPT | Mod: ,,, | Performed by: PATHOLOGY

## 2023-07-13 PROCEDURE — 88175 CYTOPATH C/V AUTO FLUID REDO: CPT | Performed by: PATHOLOGY

## 2023-07-13 PROCEDURE — 99999 PR PBB SHADOW E&M-EST. PATIENT-LVL III: CPT | Mod: PBBFAC,,, | Performed by: OBSTETRICS & GYNECOLOGY

## 2023-07-13 PROCEDURE — 3008F BODY MASS INDEX DOCD: CPT | Mod: CPTII,S$GLB,, | Performed by: OBSTETRICS & GYNECOLOGY

## 2023-07-13 PROCEDURE — 3078F PR MOST RECENT DIASTOLIC BLOOD PRESSURE < 80 MM HG: ICD-10-PCS | Mod: CPTII,S$GLB,, | Performed by: OBSTETRICS & GYNECOLOGY

## 2023-07-13 PROCEDURE — 88141 PR  CYTOPATH CERV/VAG INTERPRET: ICD-10-PCS | Mod: ,,, | Performed by: PATHOLOGY

## 2023-07-13 PROCEDURE — 1159F MED LIST DOCD IN RCRD: CPT | Mod: CPTII,S$GLB,, | Performed by: OBSTETRICS & GYNECOLOGY

## 2023-07-13 PROCEDURE — 99999 PR PBB SHADOW E&M-EST. PATIENT-LVL III: ICD-10-PCS | Mod: PBBFAC,,, | Performed by: OBSTETRICS & GYNECOLOGY

## 2023-07-13 PROCEDURE — 99395 PREV VISIT EST AGE 18-39: CPT | Mod: S$GLB,,, | Performed by: OBSTETRICS & GYNECOLOGY

## 2023-07-13 RX ORDER — DESOGESTREL AND ETHINYL ESTRADIOL 0.15-0.03
1 KIT ORAL DAILY
Qty: 90 TABLET | Refills: 4 | Status: SHIPPED | OUTPATIENT
Start: 2023-07-13 | End: 2024-10-05

## 2023-07-13 NOTE — PROGRESS NOTES
GYN Annual exam  2023    Chief Complaint   Patient presents with    Well Woman    Contraception         HISTORY OF PRESENT ILLNESS:  Pt is a  32 y.o.  alert female who presents today for GYN annual exam. She has been on OCP's for many years and denies side effects. She does smoke cigarettes but is cutting down. She did quit a couple times but then restarted.     Not currently sexually active for about 2 years. Prefers male partners when sexually active.   No other concerns.     Patient's last menstrual period was 2023.    Review of patient's allergies indicates:  No Known Allergies    Current Outpatient Medications on File Prior to Visit   Medication Sig Dispense Refill    ibuprofen (ADVIL,MOTRIN) 200 MG tablet Take 200 mg by mouth every 6 (six) hours as needed for Pain.       desogestreL-ethinyl estradioL (APRI) 0.15-0.03 mg per tablet Take 1 tablet by mouth once daily.       No current facility-administered medications on file prior to visit.       Past Medical History:   Diagnosis Date    No pertinent past medical history             Past Surgical History:   Procedure Laterality Date    APPENDECTOMY  2017    TONSILLECTOMY  2005    and adenoids       Social History     Socioeconomic History    Marital status: Single   Tobacco Use    Smoking status: Every Day     Packs/day: 1.00     Years: 14.00     Pack years: 14.00     Types: Cigarettes    Smokeless tobacco: Never   Substance and Sexual Activity    Alcohol use: Yes     Alcohol/week: 10.0 standard drinks     Types: 10 Glasses of wine per week    Drug use: Not Currently    Sexual activity: Not Currently     Partners: Male     Birth control/protection: OCP                     Family History   Problem Relation Age of Onset    Hypothyroidism Mother     COPD Father         not very extreme    Colon cancer Maternal Grandfather 65    Breast cancer Neg Hx     Ovarian cancer Neg Hx        OB History    Para Term  AB Living   0 0 0 0 0 0  "  SAB IAB Ectopic Multiple Live Births   0 0 0 0 0     Gynecological History:     Menses as above. History of HPV on pap on 5/2022. Denies other STD's.     REVIEW OF SYSTEMS:  Constitutional:  Denies Headache.  No weight changes.  No fevers or chills.    HEENT:  Denies vision changes or hearing changes. No sinus problems.  Breasts:  Denies breast masses, pain or nipple discharge on right breast. Small breast cyst at 6 o'clock on left breast just inferior to the areaola. This has been present for many years since age 15 and is not bothersome and remains unchanged. She does frequent self breast exams.    Respiratory:  No breathing issues, cough or shortness of breath  Cardiovascular:  Denies chest pain, syncope or palpitations.    GI:  Denies nausea, vomiting, diarrhea, or constipation  Endocrine:  Denies hot flashes, night sweats, heat or cold intolerance.  Hematologic:  Denies easy bruising or bleeding disorders.  Allergies/Immunologic:  Denies seasonal allergies or any history of immunologic disorders  Neurologic:  Normal sensation and motor control.  No history of seizures or syncope.  Musculoskeletal:  Denies joint pain, swelling, or erythema  Skin:  Denies rashes, significant lesions or pruritis.  Psychiatric:  Denies anxiety, depression, memory deficits, and appetite or sleep changes.      PHYSICAL EXAM  /70 (BP Location: Left arm, Patient Position: Sitting, BP Method: Medium (Manual))   Ht 5' 7" (1.702 m)   Wt 62.1 kg (136 lb 14.5 oz)   LMP 07/11/2023   BMI 21.44 kg/m²   GENERAL APPEARANCE:  The patient is a pleasant, normal appearing female with normal affect and in no distress.  BREASTS: Breast exam performed supine.  No masses, non-tender, no nipple discharge or lymphadenopathy. Small <0.5 cm palpable cystic area in left breast at 6 o'clock just inferior to the areola.  ABDOMEN: Soft, non-tender, non-distended.  No hepatosplenomegaly.  No umbilical or inguinal hernias.  SKIN:  Warm and dry to " touch.  No lesions or rashes noted.    PSYCHIATRIC/NEUROLOGIC:  Appropriate mood and affect, normal recall, alert and oriented x 3  EXTREMITIES:  Warm and well perfused.  No edema noted.  Muscle strength and sensation are normal bilaterally 5/5 in both upper and lower extremities.   :  Vulva: Inspection of her external genitalia reveals normal mons pubis, labia minora and labia majora.  Normal appearing clitoris, urethral meatus and [place]'s glands.    Bladder:  No evidence of urethral or bladder tenderness.    Vagina:  Speculum exam reveals pink and moist vaginal mucosa.  Bartholin gland is normal to palpation.  Cervix:  Cervix is normal in appearance with no lesions.  There is no cervical motion tenderness.  Uterus:  Uterus is normal size, mobile and non-tender.  No adnexal masses are palpated.  Adnexa are non-tender to palpation  Perineum:  Perineum appears normal.  Anus:  Normal with no apparent lesions.       ASSESSMENT/PLAN:  Pt is a  32 y.o.  alert female who presents today for GYN annual exam.  - Pap with HPV done and pending  - GCCT and Trich testing declinded  - Serum STD testing declined  - Contraception: happy with combined OCP's Refilled today. She denies contraindications including no history of DVT/PE, stroke, HTN, migraines with aura.   She does smoke tobacco and I encouraged her to quit.   - Clinical breast examination performed today after discussion with the patient, She did have a small cystic area on the left breast as above. She notes she's had an US of this in the past and it has not changed over time since she first noticed it at age 15. No family history of breast cancer. It is not bothersome to her. Will continue to monitor clinically at annual exams. I asked her to contact me if she has bothersome breast symptoms and further work up with imaging and exam will be completed.   - Gardasil 9 vaccination series : Completed series.   - Mammogram: Discussions regarding screening  mammography to start at age 40 per the most current ACOG guidelines  - We discussed consistent aerobic exercise, consistent seatbelt use. Take women's multivitamin use with both vitamin D and calcium supplementation; self-breast awareness was discussed and taught; the new Pap smear guidelines were reviewed   Pt voiced understanding of all counseling and instructions; no barriers to learning  RTO in 1 year or PRN        Aidan Oneil  7/13/2023

## 2023-07-18 LAB
HPV HR 12 DNA SPEC QL NAA+PROBE: NEGATIVE
HPV16 AG SPEC QL: NEGATIVE
HPV18 DNA SPEC QL NAA+PROBE: NEGATIVE

## 2023-07-20 LAB
FINAL PATHOLOGIC DIAGNOSIS: NORMAL
Lab: NORMAL

## 2023-09-12 ENCOUNTER — LAB VISIT (OUTPATIENT)
Dept: LAB | Facility: OTHER | Age: 33
End: 2023-09-12
Attending: STUDENT IN AN ORGANIZED HEALTH CARE EDUCATION/TRAINING PROGRAM
Payer: COMMERCIAL

## 2023-09-12 ENCOUNTER — OFFICE VISIT (OUTPATIENT)
Dept: INTERNAL MEDICINE | Facility: CLINIC | Age: 33
End: 2023-09-12
Payer: COMMERCIAL

## 2023-09-12 VITALS
BODY MASS INDEX: 21.56 KG/M2 | DIASTOLIC BLOOD PRESSURE: 68 MMHG | WEIGHT: 137.38 LBS | OXYGEN SATURATION: 98 % | HEART RATE: 70 BPM | SYSTOLIC BLOOD PRESSURE: 118 MMHG | HEIGHT: 67 IN

## 2023-09-12 DIAGNOSIS — F17.200 SMOKING: ICD-10-CM

## 2023-09-12 DIAGNOSIS — Z00.00 ANNUAL PHYSICAL EXAM: Primary | ICD-10-CM

## 2023-09-12 DIAGNOSIS — Z00.00 ANNUAL PHYSICAL EXAM: ICD-10-CM

## 2023-09-12 DIAGNOSIS — G47.00 INSOMNIA, UNSPECIFIED TYPE: ICD-10-CM

## 2023-09-12 DIAGNOSIS — E53.8 B12 DEFICIENCY: ICD-10-CM

## 2023-09-12 LAB
ALBUMIN SERPL BCP-MCNC: 4 G/DL (ref 3.5–5.2)
ALP SERPL-CCNC: 54 U/L (ref 55–135)
ALT SERPL W/O P-5'-P-CCNC: 9 U/L (ref 10–44)
ANION GAP SERPL CALC-SCNC: 5 MMOL/L (ref 8–16)
AST SERPL-CCNC: 14 U/L (ref 10–40)
BASOPHILS # BLD AUTO: 0.06 K/UL (ref 0–0.2)
BASOPHILS NFR BLD: 0.8 % (ref 0–1.9)
BILIRUB SERPL-MCNC: 0.5 MG/DL (ref 0.1–1)
BUN SERPL-MCNC: 9 MG/DL (ref 6–20)
CALCIUM SERPL-MCNC: 9.5 MG/DL (ref 8.7–10.5)
CHLORIDE SERPL-SCNC: 105 MMOL/L (ref 95–110)
CHOLEST SERPL-MCNC: 180 MG/DL (ref 120–199)
CHOLEST/HDLC SERPL: 3.5 {RATIO} (ref 2–5)
CO2 SERPL-SCNC: 26 MMOL/L (ref 23–29)
CREAT SERPL-MCNC: 0.7 MG/DL (ref 0.5–1.4)
DIFFERENTIAL METHOD: ABNORMAL
EOSINOPHIL # BLD AUTO: 0.2 K/UL (ref 0–0.5)
EOSINOPHIL NFR BLD: 2.1 % (ref 0–8)
ERYTHROCYTE [DISTWIDTH] IN BLOOD BY AUTOMATED COUNT: 11.3 % (ref 11.5–14.5)
EST. GFR  (NO RACE VARIABLE): >60 ML/MIN/1.73 M^2
ESTIMATED AVG GLUCOSE: 80 MG/DL (ref 68–131)
GLUCOSE SERPL-MCNC: 96 MG/DL (ref 70–110)
HBA1C MFR BLD: 4.4 % (ref 4–5.6)
HCT VFR BLD AUTO: 40 % (ref 37–48.5)
HDLC SERPL-MCNC: 51 MG/DL (ref 40–75)
HDLC SERPL: 28.3 % (ref 20–50)
HGB BLD-MCNC: 13.7 G/DL (ref 12–16)
IMM GRANULOCYTES # BLD AUTO: 0.02 K/UL (ref 0–0.04)
IMM GRANULOCYTES NFR BLD AUTO: 0.3 % (ref 0–0.5)
LDLC SERPL CALC-MCNC: 106.8 MG/DL (ref 63–159)
LYMPHOCYTES # BLD AUTO: 3.2 K/UL (ref 1–4.8)
LYMPHOCYTES NFR BLD: 41.4 % (ref 18–48)
MCH RBC QN AUTO: 32.5 PG (ref 27–31)
MCHC RBC AUTO-ENTMCNC: 34.3 G/DL (ref 32–36)
MCV RBC AUTO: 95 FL (ref 82–98)
MONOCYTES # BLD AUTO: 0.5 K/UL (ref 0.3–1)
MONOCYTES NFR BLD: 6.1 % (ref 4–15)
NEUTROPHILS # BLD AUTO: 3.8 K/UL (ref 1.8–7.7)
NEUTROPHILS NFR BLD: 49.3 % (ref 38–73)
NONHDLC SERPL-MCNC: 129 MG/DL
NRBC BLD-RTO: 0 /100 WBC
PLATELET # BLD AUTO: 256 K/UL (ref 150–450)
PMV BLD AUTO: 9.3 FL (ref 9.2–12.9)
POTASSIUM SERPL-SCNC: 4 MMOL/L (ref 3.5–5.1)
PROT SERPL-MCNC: 7 G/DL (ref 6–8.4)
RBC # BLD AUTO: 4.22 M/UL (ref 4–5.4)
SODIUM SERPL-SCNC: 136 MMOL/L (ref 136–145)
TRIGL SERPL-MCNC: 111 MG/DL (ref 30–150)
TSH SERPL DL<=0.005 MIU/L-ACNC: 0.57 UIU/ML (ref 0.4–4)
VIT B12 SERPL-MCNC: 241 PG/ML (ref 210–950)
WBC # BLD AUTO: 7.71 K/UL (ref 3.9–12.7)

## 2023-09-12 PROCEDURE — 84443 ASSAY THYROID STIM HORMONE: CPT | Performed by: STUDENT IN AN ORGANIZED HEALTH CARE EDUCATION/TRAINING PROGRAM

## 2023-09-12 PROCEDURE — 82607 VITAMIN B-12: CPT | Performed by: STUDENT IN AN ORGANIZED HEALTH CARE EDUCATION/TRAINING PROGRAM

## 2023-09-12 PROCEDURE — 3044F HG A1C LEVEL LT 7.0%: CPT | Mod: CPTII,S$GLB,, | Performed by: STUDENT IN AN ORGANIZED HEALTH CARE EDUCATION/TRAINING PROGRAM

## 2023-09-12 PROCEDURE — 99999 PR PBB SHADOW E&M-EST. PATIENT-LVL III: CPT | Mod: PBBFAC,,, | Performed by: STUDENT IN AN ORGANIZED HEALTH CARE EDUCATION/TRAINING PROGRAM

## 2023-09-12 PROCEDURE — 1159F MED LIST DOCD IN RCRD: CPT | Mod: CPTII,S$GLB,, | Performed by: STUDENT IN AN ORGANIZED HEALTH CARE EDUCATION/TRAINING PROGRAM

## 2023-09-12 PROCEDURE — 83036 HEMOGLOBIN GLYCOSYLATED A1C: CPT | Performed by: STUDENT IN AN ORGANIZED HEALTH CARE EDUCATION/TRAINING PROGRAM

## 2023-09-12 PROCEDURE — 1159F PR MEDICATION LIST DOCUMENTED IN MEDICAL RECORD: ICD-10-PCS | Mod: CPTII,S$GLB,, | Performed by: STUDENT IN AN ORGANIZED HEALTH CARE EDUCATION/TRAINING PROGRAM

## 2023-09-12 PROCEDURE — 3074F SYST BP LT 130 MM HG: CPT | Mod: CPTII,S$GLB,, | Performed by: STUDENT IN AN ORGANIZED HEALTH CARE EDUCATION/TRAINING PROGRAM

## 2023-09-12 PROCEDURE — 3008F BODY MASS INDEX DOCD: CPT | Mod: CPTII,S$GLB,, | Performed by: STUDENT IN AN ORGANIZED HEALTH CARE EDUCATION/TRAINING PROGRAM

## 2023-09-12 PROCEDURE — 3074F PR MOST RECENT SYSTOLIC BLOOD PRESSURE < 130 MM HG: ICD-10-PCS | Mod: CPTII,S$GLB,, | Performed by: STUDENT IN AN ORGANIZED HEALTH CARE EDUCATION/TRAINING PROGRAM

## 2023-09-12 PROCEDURE — 3044F PR MOST RECENT HEMOGLOBIN A1C LEVEL <7.0%: ICD-10-PCS | Mod: CPTII,S$GLB,, | Performed by: STUDENT IN AN ORGANIZED HEALTH CARE EDUCATION/TRAINING PROGRAM

## 2023-09-12 PROCEDURE — 80061 LIPID PANEL: CPT | Performed by: STUDENT IN AN ORGANIZED HEALTH CARE EDUCATION/TRAINING PROGRAM

## 2023-09-12 PROCEDURE — 3078F DIAST BP <80 MM HG: CPT | Mod: CPTII,S$GLB,, | Performed by: STUDENT IN AN ORGANIZED HEALTH CARE EDUCATION/TRAINING PROGRAM

## 2023-09-12 PROCEDURE — 99395 PR PREVENTIVE VISIT,EST,18-39: ICD-10-PCS | Mod: S$GLB,,, | Performed by: STUDENT IN AN ORGANIZED HEALTH CARE EDUCATION/TRAINING PROGRAM

## 2023-09-12 PROCEDURE — 80053 COMPREHEN METABOLIC PANEL: CPT | Performed by: STUDENT IN AN ORGANIZED HEALTH CARE EDUCATION/TRAINING PROGRAM

## 2023-09-12 PROCEDURE — 36415 COLL VENOUS BLD VENIPUNCTURE: CPT | Performed by: STUDENT IN AN ORGANIZED HEALTH CARE EDUCATION/TRAINING PROGRAM

## 2023-09-12 PROCEDURE — 3008F PR BODY MASS INDEX (BMI) DOCUMENTED: ICD-10-PCS | Mod: CPTII,S$GLB,, | Performed by: STUDENT IN AN ORGANIZED HEALTH CARE EDUCATION/TRAINING PROGRAM

## 2023-09-12 PROCEDURE — 85025 COMPLETE CBC W/AUTO DIFF WBC: CPT | Performed by: STUDENT IN AN ORGANIZED HEALTH CARE EDUCATION/TRAINING PROGRAM

## 2023-09-12 PROCEDURE — 99999 PR PBB SHADOW E&M-EST. PATIENT-LVL III: ICD-10-PCS | Mod: PBBFAC,,, | Performed by: STUDENT IN AN ORGANIZED HEALTH CARE EDUCATION/TRAINING PROGRAM

## 2023-09-12 PROCEDURE — 3078F PR MOST RECENT DIASTOLIC BLOOD PRESSURE < 80 MM HG: ICD-10-PCS | Mod: CPTII,S$GLB,, | Performed by: STUDENT IN AN ORGANIZED HEALTH CARE EDUCATION/TRAINING PROGRAM

## 2023-09-12 PROCEDURE — 99395 PREV VISIT EST AGE 18-39: CPT | Mod: S$GLB,,, | Performed by: STUDENT IN AN ORGANIZED HEALTH CARE EDUCATION/TRAINING PROGRAM

## 2023-09-12 RX ORDER — TRAZODONE HYDROCHLORIDE 50 MG/1
TABLET ORAL
Qty: 30 TABLET | Refills: 6 | Status: SHIPPED | OUTPATIENT
Start: 2023-09-12

## 2023-09-12 RX ORDER — BUPROPION HYDROCHLORIDE 150 MG/1
150 TABLET ORAL DAILY
Qty: 30 TABLET | Refills: 3 | Status: SHIPPED | OUTPATIENT
Start: 2023-09-12 | End: 2023-11-27

## 2023-09-12 NOTE — PROGRESS NOTES
[facility]    Subjective:       Patient ID: Kala Raya is a 32 y.o. female.    Chief Complaint: Annual Exam        History was obtained from the patient and supplemented through chart review.  This patient is known to me.     HPI:    Patient is a 32 y.o. female who presents for annual.    Smoking   1 ppd x 15 years, almost 15  Dad smokes, pt working on quitting, mom makes pt feel guilty  Brother used to smoke  Some SOB while running with her dog, walking up stairs  Has quit twice for a month each time  Agreed to smoking cessation referral, will place again on her birthday (reminder to myself placed)  Sometimes doesn't smoke when having trouble sleeping    Hx of B12 deficiency  recheck    3 glasses of wine a day  Drinking water     Stressful job w/ [employer] job 50-60 work weeks  Nyquil sometimes to help sleep  Sometimes     Lower back pain  Scoliosis  Joints crack more than average    Prior fainting, vasovagal episode with BM last winter    Works in [employer] office  Sister in law, 2 1/2 nephew, family stress    Medical History  History reviewed. No pertinent past medical history.      Review of Systems   Constitutional:  Negative for fever.   HENT:  Negative for trouble swallowing.    Respiratory:  Negative for shortness of breath.    Cardiovascular:  Negative for chest pain.   Gastrointestinal:  Negative for constipation, diarrhea, nausea and vomiting.   Musculoskeletal:  Negative for gait problem.   Neurological:  Negative for dizziness and seizures.   Psychiatric/Behavioral:  Positive for sleep disturbance (sometimes). Negative for hallucinations.         High stress- family and work         Surgical hx, family hx, social hx   Have been reviewed      Current Outpatient Medications:     desogestreL-ethinyl estradioL (ISIBLOOM) 0.15-0.03 mg per tablet, Take 1 tablet by mouth once daily., Disp: 90 tablet, Rfl: 4    ibuprofen (ADVIL,MOTRIN) 200 MG tablet, Take 200 mg by mouth every 6 (six) hours as needed for  "Pain., Disp: , Rfl:     buPROPion (WELLBUTRIN XL) 150 MG TB24 tablet, Take 1 tablet (150 mg total) by mouth once daily., Disp: 30 tablet, Rfl: 3    traZODone (DESYREL) 50 MG tablet, Take 1-2 tabs as needed for insomnia, Disp: 30 tablet, Rfl: 6    Objective:        Body mass index is 21.51 kg/m².  Vitals:    09/12/23 0919   BP: 118/68   Pulse: 70   SpO2: 98%   Weight: 62.3 kg (137 lb 5.6 oz)   Height: 5' 7" (1.702 m)   PainSc: 0-No pain       Physical Exam  Vitals and nursing note reviewed.   Constitutional:       General: She is not in acute distress.     Appearance: Normal appearance. She is not ill-appearing.   HENT:      Head: Normocephalic and atraumatic.   Eyes:      General: No scleral icterus.  Cardiovascular:      Rate and Rhythm: Normal rate and regular rhythm.      Heart sounds: Normal heart sounds.   Pulmonary:      Effort: Pulmonary effort is normal.   Abdominal:      General: There is no distension.   Musculoskeletal:         General: No deformity.      Cervical back: Normal range of motion.   Skin:     General: Skin is warm and dry.   Neurological:      Mental Status: She is alert and oriented to person, place, and time.   Psychiatric:         Behavior: Behavior normal.           Lab Results   Component Value Date    WBC 7.71 09/12/2023    HGB 13.7 09/12/2023    HCT 40.0 09/12/2023     09/12/2023    CHOL 180 09/12/2023    TRIG 111 09/12/2023    HDL 51 09/12/2023    ALT 9 (L) 09/12/2023    AST 14 09/12/2023     09/12/2023    K 4.0 09/12/2023     09/12/2023    CREATININE 0.7 09/12/2023    BUN 9 09/12/2023    CO2 26 09/12/2023    TSH 0.566 09/12/2023    HGBA1C 4.4 09/12/2023       The ASCVD Risk score (Codi DK, et al., 2019) failed to calculate for the following reasons:    The 2019 ASCVD risk score is only valid for ages 40 to 79    (Imaging have been independently reviewed)  Xray shoulder normal 1/28/2022    Assessment:         1. Annual physical exam    2. B12 deficiency    3. " Smoking    4. Insomnia, unspecified type            Plan:     Kala was seen today for annual exam.    Diagnoses and all orders for this visit:    Annual physical exam  -     Comprehensive Metabolic Panel; Future  -     Lipid Panel; Future  -     TSH; Future  -     CBC Auto Differential; Future  -     Hemoglobin A1C; Future    B12 deficiency  -     Vitamin B12; Future    Smoking  -     buPROPion (WELLBUTRIN XL) 150 MG TB24 tablet; Take 1 tablet (150 mg total) by mouth once daily.    Insomnia, unspecified type  -     traZODone (DESYREL) 50 MG tablet; Take 1-2 tabs as needed for insomnia          Health Maintenance  - Lipids:   - A1C:  - Colon Ca Screen: na  - Immunizations: covid vaccinated and boosted; tetanus 2018    Women's health  - Pap: NILM 5/20/2022, HPV positive, 7/13/2023 normal and HPV neg  - Mammo: na  - Dexa: na  - Contraception: OCP     Follow up in about 1 year (around 9/12/2024). or sooner prn        All medications were reviewed including potential side effects and risks/benefits.  Pt was counseled to call back if anything worsens or if questions arise.        Williams Ya MD  Family Medicine  [facility]  [address]  [address]  [place] [address]  Phone [phone]  Fax [phone]

## 2023-09-26 ENCOUNTER — TELEPHONE (OUTPATIENT)
Dept: INTERNAL MEDICINE | Facility: CLINIC | Age: 33
End: 2023-09-26
Payer: COMMERCIAL

## 2023-09-26 DIAGNOSIS — F17.200 TOBACCO DEPENDENCE: Primary | ICD-10-CM

## 2023-10-13 ENCOUNTER — PATIENT MESSAGE (OUTPATIENT)
Dept: INTERNAL MEDICINE | Facility: CLINIC | Age: 33
End: 2023-10-13
Payer: COMMERCIAL

## 2023-10-13 ENCOUNTER — OFFICE VISIT (OUTPATIENT)
Dept: URGENT CARE | Facility: CLINIC | Age: 33
End: 2023-10-13
Payer: COMMERCIAL

## 2023-10-13 VITALS
HEIGHT: 67 IN | OXYGEN SATURATION: 100 % | SYSTOLIC BLOOD PRESSURE: 113 MMHG | BODY MASS INDEX: 20.97 KG/M2 | DIASTOLIC BLOOD PRESSURE: 80 MMHG | WEIGHT: 133.63 LBS | HEART RATE: 75 BPM | RESPIRATION RATE: 15 BRPM | TEMPERATURE: 98 F

## 2023-10-13 DIAGNOSIS — L50.9 URTICARIA: Primary | ICD-10-CM

## 2023-10-13 PROCEDURE — 99213 PR OFFICE/OUTPT VISIT, EST, LEVL III, 20-29 MIN: ICD-10-PCS | Mod: S$GLB,,,

## 2023-10-13 PROCEDURE — 99213 OFFICE O/P EST LOW 20 MIN: CPT | Mod: S$GLB,,,

## 2023-10-13 RX ORDER — FAMOTIDINE 20 MG/1
20 TABLET, FILM COATED ORAL 2 TIMES DAILY
Qty: 60 TABLET | Refills: 0 | Status: SHIPPED | OUTPATIENT
Start: 2023-10-13 | End: 2023-11-27

## 2023-10-13 RX ORDER — METHYLPREDNISOLONE 4 MG/1
TABLET ORAL
COMMUNITY
Start: 2023-10-08 | End: 2023-10-13 | Stop reason: DRUGHIGH

## 2023-10-13 RX ORDER — PREDNISONE 20 MG/1
TABLET ORAL
Qty: 8 TABLET | Refills: 0 | Status: SHIPPED | OUTPATIENT
Start: 2023-10-13 | End: 2023-10-18

## 2023-10-13 RX ORDER — CETIRIZINE HYDROCHLORIDE 10 MG/1
10 TABLET ORAL DAILY
Qty: 60 TABLET | Refills: 0 | Status: SHIPPED | OUTPATIENT
Start: 2023-10-13 | End: 2023-11-27

## 2023-10-13 NOTE — PATIENT INSTRUCTIONS
Please drink plenty of fluids.  Please get plenty of rest.  Please return here or go to the Emergency Department for any concerns or worsening of condition.    Please take the cetrizine (Zyrtec) 10mg two times a day for hives relief.  You can increase your cetrizine (Zyrtec) to 20mg two times a day if the 10mg two times a day is ineffective, make this determination after 48 hours.    Please take the famotidine (Pepcid) 20mg twice a day for hives relief.   It is acceptable to take the Zyrtec and Pepcid, in the event that your medications overlap.     If you have a localized reaction it is ok to apply topical Benadryl and/or Cortaid as directed to the affected area.    You were given a referral to Allergy, please call [phone] for scheduling and appointments.     Please follow up with your primary care doctor or specialist as needed.    If you  smoke, please stop smoking.

## 2023-10-13 NOTE — PROGRESS NOTES
"Subjective:      Patient ID: Kala Raya is a 33 y.o. female.    Vitals:  height is 5' 7" (1.702 m) and weight is 60.6 kg (133 lb 9.6 oz). Her oral temperature is 97.7 °F (36.5 °C). Her blood pressure is 113/80 and her pulse is 75. Her respiration is 15 and oxygen saturation is 100%.     Chief Complaint: Rash    Patient presents with diffuse hives over her body. She states that she was recently seen in the ER (10/08/2023) and was given IV steroids, IV Pepcid, and IV benadryl and sent home with a medrol dose pack. She states that the only change that she can think of is her starting Wellbutrin recently. Patient states that she has stopped taking Wellbutrin for one day. Associated symptoms include itching, redness and hives. She denies CP, SOB, nausea, vomiting, fever, chills, chest tightness.    Rash  This is a new problem. The current episode started in the past 7 days (6 days ago). The problem is unchanged. The rash is diffuse. The rash is characterized by burning, itchiness, swelling, redness and pain. She was exposed to a new medication. Pertinent negatives include no anorexia, congestion, cough, diarrhea, eye pain, facial edema, fatigue, fever, joint pain, nail changes, rhinorrhea, shortness of breath, sore throat or vomiting. Treatments tried: Solu-Medrol , methylprednisone dose pack and benadryl. The treatment provided no relief. There is no history of allergies, asthma, eczema or varicella.       Constitution: Negative for chills, fatigue and fever.   HENT:  Negative for congestion and sore throat.    Cardiovascular:  Negative for chest pain and sob on exertion.   Eyes:  Negative for eye pain.   Respiratory:  Negative for cough and shortness of breath.    Gastrointestinal:  Negative for abdominal pain, nausea, vomiting and diarrhea.   Skin:  Positive for hives.   Allergic/Immunologic: Positive for hives and itching.      Objective:     Physical Exam   Constitutional:  Non-toxic appearance. She does not " appear ill. No distress.      Comments:Patient is in no acute distress, patient is non-toxic appearing, patient is ox3, patient is answering question appropriately. Patient appears uncomfortable.   normal  HENT:   Nose: Nose normal. No rhinorrhea or congestion.   Mouth/Throat: Uvula is midline, oropharynx is clear and moist and mucous membranes are normal. Mucous membranes are moist. No oral lesions. No trismus in the jaw. No uvula swelling. No oropharyngeal exudate, posterior oropharyngeal edema, posterior oropharyngeal erythema, tonsillar abscesses or cobblestoning. Tonsils are 1+ on the right. Tonsils are 1+ on the left. No tonsillar exudate. Oropharynx is clear.   No drooling, no tripoding. Patient is able to handle secretions. Patient appears to be in no acute respiratory distress. Airway is intact. Patient is able to talk in complete sentences without discomfort. There is no angioedema appreciated on facial exam.      Comments: No drooling, no tripoding. Patient is able to handle secretions. Patient appears to be in no acute respiratory distress. Airway is intact. Patient is able to talk in complete sentences without discomfort. There is no angioedema appreciated on facial exam.  Cardiovascular: Normal rate, regular rhythm and normal heart sounds.   No murmur heard.Exam reveals no gallop and no friction rub.   Pulmonary/Chest: Effort normal and breath sounds normal. No stridor. No respiratory distress. She has no wheezes. She has no rhonchi. She has no rales. She exhibits no tenderness.         Comments: Patient is in no respiratory distress. Breath sounds even, unlabored, and clear to auscultation bilaterally. No accessory muscle usage. Patient able to speak in complete sentences with ease.    Abdominal: Normal appearance.   Lymphadenopathy:     She has no cervical adenopathy.   Neurological: She is alert.   Skin: Skin is not diaphoretic and rash. Capillary refill takes less than 2 seconds.         Comments:  There are hives appreciated on bilateral arms, bilateral legs and thighs, chest and back. There is no area of fluctuance, no area of induration, no discharge, no warmth appreciated on exam.   Nursing note and vitals reviewed.    Assessment:     1. Urticaria      Plan:   Previous notes reviewed.  Vital signs reviewed.  Discussed urticaria likely due to allergic reaction to new medication, home care, tx options, and given follow up precautions.  Patient was briefed on my thought process and diagnosis.   Patient involved with the treatment plan and agreed to the plan.  Patient informed on warning signs, patient understood warning signs and to go to urgent care or ER if warning signs appear.  Please excuse grammatical/spelling errors appreciated throughout this visit encounter for a remote dictation device was used during this encounter.  Patient discussed with Dr. Gilbert Moreno via secure chat, Dr. Moreno agrees to the plan.     Patient Instructions   Please drink plenty of fluids.  Please get plenty of rest.  Please return here or go to the Emergency Department for any concerns or worsening of condition.    Please take the cetrizine (Zyrtec) 10mg two times a day for hives relief.  You can increase your cetrizine (Zyrtec) to 20mg two times a day if the 10mg two times a day is ineffective, make this determination after 48 hours.    Please take the famotidine (Pepcid) 20mg twice a day for hives relief.   It is acceptable to take the Zyrtec and Pepcid, in the event that your medications overlap.     If you have a localized reaction it is ok to apply topical Benadryl and/or Cortaid as directed to the affected area.    You were given a referral to Allergy, please call [phone] for scheduling and appointments.     Please follow up with your primary care doctor or specialist as needed.    If you  smoke, please stop smoking.     Urticaria  -     predniSONE (DELTASONE) 20 MG tablet; Take 2 tablets (40 mg total) by mouth once  daily for 3 days, THEN 1 tablet (20 mg total) once daily for 2 days.  Dispense: 8 tablet; Refill: 0  -     cetirizine (ZYRTEC) 10 MG tablet; Take 1 tablet (10 mg total) by mouth once daily.  Dispense: 60 tablet; Refill: 0  -     famotidine (PEPCID) 20 MG tablet; Take 1 tablet (20 mg total) by mouth 2 (two) times daily.  Dispense: 60 tablet; Refill: 0  -     Ambulatory referral/consult to Allergy      Katheryn'Hai Goldstein PA-C

## 2023-11-09 ENCOUNTER — PATIENT MESSAGE (OUTPATIENT)
Dept: OBSTETRICS AND GYNECOLOGY | Facility: CLINIC | Age: 33
End: 2023-11-09
Payer: COMMERCIAL

## 2023-11-09 DIAGNOSIS — N60.02 BREAST CYST, LEFT: Primary | ICD-10-CM

## 2023-11-27 ENCOUNTER — OFFICE VISIT (OUTPATIENT)
Dept: SURGERY | Facility: CLINIC | Age: 33
End: 2023-11-27
Payer: COMMERCIAL

## 2023-11-27 VITALS
HEART RATE: 66 BPM | DIASTOLIC BLOOD PRESSURE: 77 MMHG | SYSTOLIC BLOOD PRESSURE: 111 MMHG | WEIGHT: 133 LBS | BODY MASS INDEX: 20.83 KG/M2

## 2023-11-27 DIAGNOSIS — Z12.31 SCREENING MAMMOGRAM, ENCOUNTER FOR: Primary | ICD-10-CM

## 2023-11-27 DIAGNOSIS — N60.02 BREAST CYST, LEFT: ICD-10-CM

## 2023-11-27 PROCEDURE — 3078F PR MOST RECENT DIASTOLIC BLOOD PRESSURE < 80 MM HG: ICD-10-PCS | Mod: CPTII,S$GLB,, | Performed by: PHYSICIAN ASSISTANT

## 2023-11-27 PROCEDURE — 3078F DIAST BP <80 MM HG: CPT | Mod: CPTII,S$GLB,, | Performed by: PHYSICIAN ASSISTANT

## 2023-11-27 PROCEDURE — 1159F MED LIST DOCD IN RCRD: CPT | Mod: CPTII,S$GLB,, | Performed by: PHYSICIAN ASSISTANT

## 2023-11-27 PROCEDURE — 3044F HG A1C LEVEL LT 7.0%: CPT | Mod: CPTII,S$GLB,, | Performed by: PHYSICIAN ASSISTANT

## 2023-11-27 PROCEDURE — 3044F PR MOST RECENT HEMOGLOBIN A1C LEVEL <7.0%: ICD-10-PCS | Mod: CPTII,S$GLB,, | Performed by: PHYSICIAN ASSISTANT

## 2023-11-27 PROCEDURE — 99213 OFFICE O/P EST LOW 20 MIN: CPT | Mod: S$GLB,,, | Performed by: PHYSICIAN ASSISTANT

## 2023-11-27 PROCEDURE — 99213 PR OFFICE/OUTPT VISIT, EST, LEVL III, 20-29 MIN: ICD-10-PCS | Mod: S$GLB,,, | Performed by: PHYSICIAN ASSISTANT

## 2023-11-27 PROCEDURE — 3008F PR BODY MASS INDEX (BMI) DOCUMENTED: ICD-10-PCS | Mod: CPTII,S$GLB,, | Performed by: PHYSICIAN ASSISTANT

## 2023-11-27 PROCEDURE — 1159F PR MEDICATION LIST DOCUMENTED IN MEDICAL RECORD: ICD-10-PCS | Mod: CPTII,S$GLB,, | Performed by: PHYSICIAN ASSISTANT

## 2023-11-27 PROCEDURE — 99999 PR PBB SHADOW E&M-EST. PATIENT-LVL III: CPT | Mod: PBBFAC,,, | Performed by: PHYSICIAN ASSISTANT

## 2023-11-27 PROCEDURE — 99999 PR PBB SHADOW E&M-EST. PATIENT-LVL III: ICD-10-PCS | Mod: PBBFAC,,, | Performed by: PHYSICIAN ASSISTANT

## 2023-11-27 PROCEDURE — 3074F PR MOST RECENT SYSTOLIC BLOOD PRESSURE < 130 MM HG: ICD-10-PCS | Mod: CPTII,S$GLB,, | Performed by: PHYSICIAN ASSISTANT

## 2023-11-27 PROCEDURE — 3074F SYST BP LT 130 MM HG: CPT | Mod: CPTII,S$GLB,, | Performed by: PHYSICIAN ASSISTANT

## 2023-11-27 PROCEDURE — 3008F BODY MASS INDEX DOCD: CPT | Mod: CPTII,S$GLB,, | Performed by: PHYSICIAN ASSISTANT

## 2023-11-27 NOTE — PROGRESS NOTES
[facility]  Department of Surgery      REFERRING PROVIDER: Aidan Oniel Md  [address]  [address]  [place] [address]  PCP: Williams Ya MD  CHIEF COMPLAINT: breast cyst, left      Subjective:      Kala Raya is a 33 y.o. premenopausal female referred for evaluation of a chronic left breast mass. Change was noted several years ago, she notes first feeling it when she was a teenager.  Patient does routinely do self breast exams.  Patient has noted a change on breast exam.  Patient denies nipple discharge. Patient denies to previous breast biopsy. Patient denies a personal history of breast cancer.    GYN History:  Age of menarche was 16.   Current OCP use.   LMP: current  Patient is .       FAMILY history:  Maternal GF: colon cancer  Maternal GM: lung cancer, smoker    No past medical history on file.  Past Surgical History:   Procedure Laterality Date    APPENDECTOMY  2017    TONSILLECTOMY      and adenoids     Current Outpatient Medications on File Prior to Visit   Medication Sig Dispense Refill    desogestreL-ethinyl estradioL (ISIBLOOM) 0.15-0.03 mg per tablet Take 1 tablet by mouth once daily. 90 tablet 4    ibuprofen (ADVIL,MOTRIN) 200 MG tablet Take 200 mg by mouth every 6 (six) hours as needed for Pain.      traZODone (DESYREL) 50 MG tablet Take 1-2 tabs as needed for insomnia 30 tablet 6    buPROPion (WELLBUTRIN XL) 150 MG TB24 tablet Take 1 tablet (150 mg total) by mouth once daily. (Patient not taking: Reported on 10/13/2023) 30 tablet 3    cetirizine (ZYRTEC) 10 MG tablet Take 1 tablet (10 mg total) by mouth once daily. 60 tablet 0    famotidine (PEPCID) 20 MG tablet Take 1 tablet (20 mg total) by mouth 2 (two) times daily. 60 tablet 0     No current facility-administered medications on file prior to visit.     Social History     Socioeconomic History    Marital status: Single   Tobacco Use    Smoking status: Every Day     Current packs/day: 1.00      Average packs/day: 1 pack/day for 14.0 years (14.0 ttl pk-yrs)     Types: Cigarettes    Smokeless tobacco: Never   Substance and Sexual Activity    Alcohol use: Yes     Alcohol/week: 10.0 standard drinks of alcohol     Types: 10 Glasses of wine per week    Drug use: Not Currently    Sexual activity: Not Currently     Partners: Male     Birth control/protection: OCP     Family History   Problem Relation Age of Onset    Hypothyroidism Mother     COPD Father         not very extreme    Colon cancer Maternal Grandfather 65    Breast cancer Neg Hx     Ovarian cancer Neg Hx        Review of Systems  Review of Systems   Constitutional:  Negative for chills, fever and malaise/fatigue.   HENT:  Negative for congestion.    Eyes:  Negative for discharge.   Respiratory:  Negative for cough, shortness of breath and stridor.    Cardiovascular:  Negative for chest pain and palpitations.   Gastrointestinal:  Negative for abdominal pain and nausea.   Neurological:  Negative for headaches.   Psychiatric/Behavioral:  The patient is not nervous/anxious.         Objective:        /77   Pulse 66   Wt 60.3 kg (133 lb)   BMI 20.83 kg/m²   Physical Exam   Vitals reviewed.  Constitutional: She is oriented to person, place, and time.   HENT:   Head: Normocephalic and atraumatic.   Nose: Nose normal.   Eyes: Pupils are equal, round, and reactive to light. Right eye exhibits no discharge. Left eye exhibits no discharge.   Pulmonary/Chest: Effort normal and breath sounds normal. No stridor. No respiratory distress. She exhibits no mass, no tenderness and no edema. Right breast exhibits no inverted nipple, no mass, no nipple discharge, no skin change and no tenderness. Left breast exhibits mass and skin change. Left breast exhibits no inverted nipple, no nipple discharge and no tenderness. No breast swelling or bleeding. Breasts are symmetrical.       Abdominal: Normal appearance.   Genitourinary: No breast swelling or bleeding.    Neurological: She is alert and oriented to person, place, and time.   Skin: Skin is warm and dry.     Psychiatric: Her behavior is normal. Mood, judgment and thought content normal.        Radiology review: Images personally reviewed by me in the clinic.      NONE    Assessment:      Kala Raya is a 33 y.o. premenopausal female with left breast intradermal mass, likely cyst.      Plan:   We discussed the options for management of this new left breast change, chronic.    We discussed further evaluation with imaging including an ultrasound and mammogram.     We also discussed the need for biopsy with image guidance. We explained the rational of proceeding with image-guided biopsy rather than surgical excision.  Discussed that the we prefer to have a diagnosis prior to moving forward with surgery as it could impact surgical decision making.    Finding on exam appears to be a benign appearing intradermal mass. This new finding on my exam, but per patient has been present for several years, but I do recommend imaging to better evaluate.     Will be in touch with patient following results.     Patient verbalized understanding of plan. All questions asked and answered. Advised to call our office with any new or worsening sxs.       Total time spent with the patient: 30.  20 minutes of face to face consultation and 10 minutes of chart review and coordination of care.

## 2024-01-03 ENCOUNTER — HOSPITAL ENCOUNTER (OUTPATIENT)
Dept: RADIOLOGY | Facility: HOSPITAL | Age: 34
Discharge: HOME | End: 2024-01-03
Attending: ORTHOPAEDIC SURGERY
Payer: COMMERCIAL

## 2024-01-03 ENCOUNTER — OFFICE VISIT (OUTPATIENT)
Dept: SPORTS MEDICINE | Facility: CLINIC | Age: 34
End: 2024-01-03
Payer: COMMERCIAL

## 2024-01-03 ENCOUNTER — PATIENT MESSAGE (OUTPATIENT)
Dept: SPORTS MEDICINE | Facility: CLINIC | Age: 34
End: 2024-01-03

## 2024-01-03 VITALS
BODY MASS INDEX: 21.03 KG/M2 | WEIGHT: 134 LBS | HEIGHT: 67 IN | SYSTOLIC BLOOD PRESSURE: 151 MMHG | DIASTOLIC BLOOD PRESSURE: 89 MMHG | HEART RATE: 72 BPM

## 2024-01-03 DIAGNOSIS — M25.312 INSTABILITY OF BOTH SHOULDER JOINTS: ICD-10-CM

## 2024-01-03 DIAGNOSIS — M25.311 INSTABILITY OF BOTH SHOULDER JOINTS: Primary | ICD-10-CM

## 2024-01-03 DIAGNOSIS — M25.312 INSTABILITY OF BOTH SHOULDER JOINTS: Primary | ICD-10-CM

## 2024-01-03 DIAGNOSIS — M25.512 LEFT SHOULDER PAIN, UNSPECIFIED CHRONICITY: ICD-10-CM

## 2024-01-03 DIAGNOSIS — M25.311 INSTABILITY OF BOTH SHOULDER JOINTS: ICD-10-CM

## 2024-01-03 PROCEDURE — 3008F BODY MASS INDEX DOCD: CPT | Mod: CPTII,S$GLB,, | Performed by: ORTHOPAEDIC SURGERY

## 2024-01-03 PROCEDURE — 99214 OFFICE O/P EST MOD 30 MIN: CPT | Mod: S$GLB,,, | Performed by: ORTHOPAEDIC SURGERY

## 2024-01-03 PROCEDURE — 3079F DIAST BP 80-89 MM HG: CPT | Mod: CPTII,S$GLB,, | Performed by: ORTHOPAEDIC SURGERY

## 2024-01-03 PROCEDURE — 99999 PR PBB SHADOW E&M-EST. PATIENT-LVL III: CPT | Mod: PBBFAC,,, | Performed by: ORTHOPAEDIC SURGERY

## 2024-01-03 PROCEDURE — 1159F MED LIST DOCD IN RCRD: CPT | Mod: CPTII,S$GLB,, | Performed by: ORTHOPAEDIC SURGERY

## 2024-01-03 PROCEDURE — 73030 X-RAY EXAM OF SHOULDER: CPT | Mod: TC,LT

## 2024-01-03 PROCEDURE — 3077F SYST BP >= 140 MM HG: CPT | Mod: CPTII,S$GLB,, | Performed by: ORTHOPAEDIC SURGERY

## 2024-01-03 PROCEDURE — 73030 X-RAY EXAM OF SHOULDER: CPT | Mod: 26,LT,, | Performed by: RADIOLOGY

## 2024-01-03 RX ORDER — METHYLPREDNISOLONE 4 MG/1
TABLET ORAL
Qty: 21 EACH | Refills: 0 | Status: SHIPPED | OUTPATIENT
Start: 2024-01-03 | End: 2024-01-24

## 2024-01-03 NOTE — PROGRESS NOTES
ESTABLISHED PATIENT    History 1/3/2023:  Kala returns to clinic today for follow-up of left shoulder pain. She states she was driving for a long time over the holidays. When she woke up she felt like her shoulder was out of place again. She has not been able to find relief.  She states she had tried physical therapy for months prior.  She states the pain is worse today.  She wants to consider further options.    History 5/29/2023:  Kala returns to clinic today for follow-up of bilateral shoulder pain. She states that her right shoulder is starting to bother her like her left shoulder. She reports her right shoulder coming out of place since last visit. She was going to physical therapy and is interested in continuing that. She also reports some right hip pain today.    History 1/28/2022:   33 y.o. 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 She has a dog that keeps her busy. 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 She states that she feels like the shoulder dislocates. She states she also has neck pain.   She states her left shoulder has slipped out about 3 times.  She states she is feeling get unstable in her sleep.      - mechanical symptoms, + instability    Is affecting ADLs.  Pain is 0/10 at it's worst.    REVIEW OF SYSTEMS   Constitution: Negative. Negative for chills, fever and night sweats.   HENT: Negative for congestion and headaches.    Eyes: Negative for blurred vision, left vision loss and right vision loss.   Cardiovascular: Negative for chest pain and syncope.   Respiratory: Negative for cough and shortness of breath.    Endocrine: Negative for polydipsia, polyphagia and polyuria.   Hematologic/Lymphatic: Negative for bleeding problem. Does not bruise/bleed easily.   Skin: Negative for dry skin, itching and rash.   Musculoskeletal: Negative for falls. Positive for left shoulder pain  Gastrointestinal: Negative for abdominal pain and bowel incontinence.  "  Genitourinary: Negative for bladder incontinence and nocturia.   Neurological: Negative for disturbances in coordination, loss of balance and seizures.   Psychiatric/Behavioral: Negative for depression. The patient does not have insomnia.    Allergic/Immunologic: Negative for hives and persistent infections.     PHYSICAL EXAMINATION    Vitals: BP (!) 151/89   Pulse 72   Ht 5' 7" (1.702 m)   Wt 60.8 kg (134 lb)   BMI 20.99 kg/m²     General: The patient appears active and healthy with no apparent physical problems.  No disturbance of mood or affect is demonstrated. Alert and Oriented.    HEENT: Eyes normal, pupils equally round, nose normal.    Resp: Equal and symmetrical chest rises. No wheezing    CV: Regular rate    Neck: Supple; nonpainful range of motion.    Extremities: no cyanosis, clubbing, edema, or diffuse swelling.  Palpable pulses, good capillary refill of the digits.  No coolness, discoloration, edema or obvious varicosities.    Skin: no lesions noted.    Lymphatic: no detected adenopathy in the upper or lower extremities.    Neurologic: normal mental status, normal reflexes, normal gait and balance.  Patient is alert and oriented to person, place and time.  No flaccidity or spasticity is noted.  No motor or sensory deficits are noted.  Light touch is intact    Orthopaedic:     SHOULDER EXAM - LEFT    Inspection:   Normal skin color and appearance with no scars.  No muscle atrophy noted.  No scapular winging.      Palpation: No tenderness of the acromioclavicular joint, lateral edge of the acromion, biceps tendon, trapezius muscle or scapulothoracic bursa.      ROM:      PROM:     FE - 180°    Abd/ER -  90°  Abd/IR -  45°   Add/ER -  90°     AROM:    FE - 180°    Abd/ER -  90°  Abd/IR -  45°   Add/ER -  90    Tests:     - Aguirre, - Neer's, - Cross Arm Adduction, - [place], - Yerguson, - Speed. - Belly Press,  - Jobes, - Lift Off    Stability: + sulcus, - apprehension, - relocation, - load and " shift, - DLS  2+ anterior 2+ inferior and 3+ posterior load and shift    Motor:  Rotator cuff strength is 5/5 supraspinatus, 5/5 infraspinatus, 5/5 subscapularis. Biceps, triceps and deltoid strength is 5/5.      Neuro     Distally there are no paresthesias, and sensation is intact to light touch in the median, ulnar, and radial distributions.  Reflexes are 2/2 biceps, triceps and brachioradialis.      Beighton scale: 6/9    SHOULDER EXAM - RIGHT    Inspection:   Normal skin color and appearance with no scars.  No muscle atrophy noted.  No scapular winging.      Palpation: No tenderness of the acromioclavicular joint, lateral edge of the acromion, biceps tendon, trapezius muscle or scapulothoracic bursa.      ROM:      PROM:     FE - 180°    Abd/ER -  90°  Abd/IR -  45°   Add/ER -  80°     AROM:    FE - 180°    Abd/ER -  90°  Abd/IR -  45°   Add/ER -  80° 150 degrees hyper abduction         Tests:     - Aguirre, - Neer's, - Cross Arm Adduction, + [place], - Yerguson, - Speed. - Belly Press,  - Jobes, - Lift Off    Stability: + sulcus, - apprehension, - relocation, - load and shift, - DLS  2+ anterior 2+ inferior and 3+ posterior load and shift    Motor:  Rotator cuff strength is 5/5 supraspinatus, 5/5 infraspinatus, 5/5 subscapularis. Biceps, triceps and deltoid strength is 5/5.      Neuro     Distally there are no paresthesias, and sensation is intact to light touch in the median, ulnar, and radial distributions.  Reflexes are 2/2 biceps, triceps and brachioradialis.      IMAGING    X-Ray Shoulder 2 or More Views Left  Narrative: EXAMINATION:  XR SHOULDER COMPLETE 2 OR MORE VIEWS LEFT    CLINICAL HISTORY:  Other instability, right shoulder    TECHNIQUE:  Two or three views of the left shoulder were performed.    COMPARISON:  01/28/2022    FINDINGS:  No displaced fracture, dislocation or bone destruction.  Joint spaces and alignment are maintained.  Surrounding soft tissue structures show no significant  abnormalities.  Mild scoliosis which is only partially visualized.  Impression: No acute radiographic abnormalities.    Electronically signed by: Terrence Bledsoe MD  Date:    01/03/2024  Time:    13:07      IMPRESSION       ICD-10-CM ICD-9-CM   1. Instability of both shoulder joints  M25.311 718.81    M25.312    2. Left shoulder pain, unspecified chronicity  M25.512 719.41       MEDICATIONS PRESCRIBED      Medrol dose pack    RECOMMENDATIONS     MRA left shoulder ordered  RTC after MRA      All questions were answered, pt will contact us for questions or concerns in the interim.

## 2024-01-05 ENCOUNTER — TELEPHONE (OUTPATIENT)
Dept: RADIOLOGY | Facility: HOSPITAL | Age: 34
End: 2024-01-05
Payer: COMMERCIAL

## 2024-01-05 NOTE — TELEPHONE ENCOUNTER
----- Message from Hebert Lopez sent at 1/5/2024  8:23 AM CST -----  Good morning,    Can we get this pt's mmg and ultrasound rescheduled between 1/15/2024 - 1/26/2024 please?            Thank you!!  Hebert

## 2024-01-05 NOTE — TELEPHONE ENCOUNTER
Called patient to reschedule breast imaging,  no answer. Left message with my contact information.

## 2024-01-17 ENCOUNTER — HOSPITAL ENCOUNTER (OUTPATIENT)
Dept: RADIOLOGY | Facility: HOSPITAL | Age: 34
Discharge: HOME | End: 2024-01-17
Attending: ORTHOPAEDIC SURGERY
Payer: COMMERCIAL

## 2024-01-17 DIAGNOSIS — M25.512 LEFT SHOULDER PAIN, UNSPECIFIED CHRONICITY: ICD-10-CM

## 2024-01-17 DIAGNOSIS — M25.312 INSTABILITY OF BOTH SHOULDER JOINTS: ICD-10-CM

## 2024-01-17 DIAGNOSIS — M25.311 INSTABILITY OF BOTH SHOULDER JOINTS: ICD-10-CM

## 2024-01-17 PROCEDURE — 77002 NEEDLE LOCALIZATION BY XRAY: CPT | Mod: 26,LT,, | Performed by: RADIOLOGY

## 2024-01-17 PROCEDURE — 25000003 PHARM REV CODE 250: Performed by: ORTHOPAEDIC SURGERY

## 2024-01-17 PROCEDURE — 25500020 PHARM REV CODE 255: Performed by: ORTHOPAEDIC SURGERY

## 2024-01-17 PROCEDURE — 73222 MRI JOINT UPR EXTREM W/DYE: CPT | Mod: TC,LT

## 2024-01-17 PROCEDURE — 73221 MRI JOINT UPR EXTREM W/O DYE: CPT | Mod: 26,LT,, | Performed by: RADIOLOGY

## 2024-01-17 PROCEDURE — A9585 GADOBUTROL INJECTION: HCPCS | Performed by: ORTHOPAEDIC SURGERY

## 2024-01-17 PROCEDURE — 23350 INJECTION FOR SHOULDER X-RAY: CPT | Mod: LT,,, | Performed by: RADIOLOGY

## 2024-01-17 RX ORDER — LIDOCAINE HYDROCHLORIDE 10 MG/ML
6 INJECTION INFILTRATION; PERINEURAL ONCE
Status: COMPLETED | OUTPATIENT
Start: 2024-01-17 | End: 2024-01-17

## 2024-01-17 RX ORDER — GADOBUTROL 604.72 MG/ML
5 INJECTION INTRAVENOUS
Status: COMPLETED | OUTPATIENT
Start: 2024-01-17 | End: 2024-01-17

## 2024-01-17 RX ADMIN — LIDOCAINE HYDROCHLORIDE 6 ML: 10 INJECTION, SOLUTION INFILTRATION; PERINEURAL at 03:01

## 2024-01-17 RX ADMIN — GADOBUTROL 5 ML: 604.72 INJECTION INTRAVENOUS at 03:01

## 2024-01-17 RX ADMIN — IOHEXOL 6 ML: 300 INJECTION, SOLUTION INTRAVENOUS at 03:01

## 2024-02-21 ENCOUNTER — PATIENT MESSAGE (OUTPATIENT)
Dept: SPORTS MEDICINE | Facility: CLINIC | Age: 34
End: 2024-02-21
Payer: COMMERCIAL

## 2024-02-26 ENCOUNTER — OFFICE VISIT (OUTPATIENT)
Dept: SPORTS MEDICINE | Facility: CLINIC | Age: 34
End: 2024-02-26
Payer: COMMERCIAL

## 2024-02-26 DIAGNOSIS — M25.311 INSTABILITY OF BOTH SHOULDER JOINTS: ICD-10-CM

## 2024-02-26 DIAGNOSIS — M25.312 INSTABILITY OF BOTH SHOULDER JOINTS: ICD-10-CM

## 2024-02-26 DIAGNOSIS — S43.432A TEAR OF LEFT GLENOID LABRUM, INITIAL ENCOUNTER: Primary | ICD-10-CM

## 2024-02-26 DIAGNOSIS — M25.512 LEFT SHOULDER PAIN, UNSPECIFIED CHRONICITY: ICD-10-CM

## 2024-02-26 PROCEDURE — 99442 PR PHYSICIAN TELEPHONE EVALUATION 11-20 MIN: CPT | Mod: 95,,, | Performed by: ORTHOPAEDIC SURGERY

## 2024-02-26 NOTE — PROGRESS NOTES
Established Patient - Audio Only Telehealth Visit     The patient location is: home  The chief complaint leading to consultation is: Discuss MRA results of left shoulder  Visit type: Virtual visit with audio only (telephone)  Total time spent with patient: 15 min       The reason for the audio only service rather than synchronous audio and video virtual visit was related to technical difficulties or patient preference/necessity.     Each patient to whom I provide medical services by telemedicine is:  (1) informed of the relationship between the physician and patient and the respective role of any other health care provider with respect to management of the patient; and (2) notified that they may decline to receive medical services by telemedicine and may withdraw from such care at any time. Patient verbally consented to receive this service via voice-only telephone call.    HISTORY OF PRESENT ILLNESS    History 2/26/2024:  I spoke with Kala today to discuss her MRA results of her left shoulder.    History 1/3/2023:  Kala returns to clinic today for follow-up of left shoulder pain. She states she was driving for a long time over the holidays. When she woke up she felt like her shoulder was out of place again. She has not been able to find relief.  She states she had tried physical therapy for months prior.  She states the pain is worse today.  She wants to consider further options.     History 5/29/2023:  Kala returns to clinic today for follow-up of bilateral shoulder pain. She states that her right shoulder is starting to bother her like her left shoulder. She reports her right shoulder coming out of place since last visit. She was going to physical therapy and is interested in continuing that. She also reports some right hip pain today.     History 1/28/2022:   33 y.o. 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 She has a dog that keeps her busy. 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 She states that she feels like the shoulder dislocates. She states she also has neck pain.   She states her left shoulder has slipped out about 3 times.  She states she is feeling get unstable in her sleep.    PLAN:    Surgical versus non-surgical options discussed today; left shoulder arthroscopy with posterior labrum repair and capsulorraphy  The patient elected to proceed with operative intervention after all risks, benefits, and alternatives were discussed with the patient.  The risks of surgery include bleeding, scar formation, injuries to nerves, arteries and veins, need for additional surgeries, blood clots, infections, inability to return to the same level of the performance and the risk of anesthesia.   RTC for pre-op with Andrea Maciel PA-C     This service was not originating from a related E/M service provided within the previous 7 days nor will  to an E/M service or procedure within the next 24 hours or my soonest available appointment.  Prevailing standard of care was able to be met in this audio-only visit.

## 2024-02-27 DIAGNOSIS — S43.432A TEAR OF LEFT GLENOID LABRUM, INITIAL ENCOUNTER: Primary | ICD-10-CM

## 2024-03-10 ENCOUNTER — PATIENT MESSAGE (OUTPATIENT)
Dept: SPORTS MEDICINE | Facility: CLINIC | Age: 34
End: 2024-03-10
Payer: COMMERCIAL

## 2024-04-10 ENCOUNTER — OFFICE VISIT (OUTPATIENT)
Dept: SPORTS MEDICINE | Facility: CLINIC | Age: 34
End: 2024-04-10
Payer: COMMERCIAL

## 2024-04-10 VITALS
DIASTOLIC BLOOD PRESSURE: 77 MMHG | WEIGHT: 139.31 LBS | BODY MASS INDEX: 21.87 KG/M2 | HEART RATE: 79 BPM | HEIGHT: 67 IN | SYSTOLIC BLOOD PRESSURE: 126 MMHG

## 2024-04-10 DIAGNOSIS — S43.432A TEAR OF LEFT GLENOID LABRUM, INITIAL ENCOUNTER: Primary | ICD-10-CM

## 2024-04-10 DIAGNOSIS — M25.311 INSTABILITY OF BOTH SHOULDER JOINTS: ICD-10-CM

## 2024-04-10 DIAGNOSIS — M25.312 INSTABILITY OF BOTH SHOULDER JOINTS: ICD-10-CM

## 2024-04-10 PROCEDURE — 99499 UNLISTED E&M SERVICE: CPT | Mod: S$GLB,,, | Performed by: PHYSICIAN ASSISTANT

## 2024-04-10 PROCEDURE — 99999 PR PBB SHADOW E&M-EST. PATIENT-LVL III: CPT | Mod: PBBFAC,,, | Performed by: PHYSICIAN ASSISTANT

## 2024-04-10 RX ORDER — MUPIROCIN 20 MG/G
OINTMENT TOPICAL
Status: CANCELLED | OUTPATIENT
Start: 2024-04-10

## 2024-04-10 RX ORDER — CEFAZOLIN SODIUM 2 G/50ML
2 SOLUTION INTRAVENOUS
Status: CANCELLED | OUTPATIENT
Start: 2024-04-10

## 2024-04-10 RX ORDER — HYDROCODONE BITARTRATE AND ACETAMINOPHEN 7.5; 325 MG/1; MG/1
1 TABLET ORAL EVERY 6 HOURS PRN
Qty: 20 TABLET | Refills: 0 | Status: SHIPPED | OUTPATIENT
Start: 2024-04-10

## 2024-04-10 NOTE — PROGRESS NOTES
PREOPERATIVE APPOINTMENT    History 4/10/2024:  Britt returns here today for follow-up evaluation of her left shoulder and to complete her preoperative paperwork.  She continues to have pain and functional limitations despite conservative treatment.  Surgical intervention has been recommended and she is scheduled to undergo a left shoulder arthroscopy with posterior labral repair and capsulorrhaphy on April 18, 2024.    History 2/26/2024:  I spoke with Kala today to discuss her MRA results of her left shoulder.    History 1/3/2023:  Kala returns to clinic today for follow-up of left shoulder pain. She states she was driving for a long time over the holidays. When she woke up she felt like her shoulder was out of place again. She has not been able to find relief.  She states she had tried physical therapy for months prior.  She states the pain is worse today.  She wants to consider further options.     History 5/29/2023:  Kala returns to clinic today for follow-up of bilateral shoulder pain. She states that her right shoulder is starting to bother her like her left shoulder. She reports her right shoulder coming out of place since last visit. She was going to physical therapy and is interested in continuing that. She also reports some right hip pain today.     History 1/28/2022:   33 y.o. 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 She has a dog that keeps her busy. 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 She states that she feels like the shoulder dislocates. She states she also has neck pain.   She states her left shoulder has slipped out about 3 times.  She states she is feeling get unstable in her sleep.    REVIEW OF SYSTEMS   Constitution: Negative. Negative for chills, fever and night sweats.   HENT: Negative for congestion and headaches.    Eyes: Negative for blurred vision, left vision loss and right vision loss.   Cardiovascular: Negative for chest pain and  "syncope.   Respiratory: Negative for cough and shortness of breath.    Endocrine: Negative for polydipsia, polyphagia and polyuria.   Hematologic/Lymphatic: Negative for bleeding problem. Does not bruise/bleed easily.   Skin: Negative for dry skin, itching and rash.   Musculoskeletal: Negative for falls. Positive for left shoulder pain  Gastrointestinal: Negative for abdominal pain and bowel incontinence.   Genitourinary: Negative for bladder incontinence and nocturia.   Neurological: Negative for disturbances in coordination, loss of balance and seizures.   Psychiatric/Behavioral: Negative for depression. The patient does not have insomnia.    Allergic/Immunologic: Negative for hives and persistent infections.     PHYSICAL EXAMINATION    Vitals: /77   Pulse 79   Ht 5' 7" (1.702 m)   Wt 63.2 kg (139 lb 5.3 oz)   BMI 21.82 kg/m²     General: The patient appears active and healthy with no apparent physical problems.  No disturbance of mood or affect is demonstrated. Alert and Oriented.    HEENT: Eyes normal, pupils equally round, nose normal.    Resp: Equal and symmetrical chest rises. No wheezing    CV: Regular rate    Neck: Supple; nonpainful range of motion.    Extremities: no cyanosis, clubbing, edema, or diffuse swelling.  Palpable pulses, good capillary refill of the digits.  No coolness, discoloration, edema or obvious varicosities.    Skin: no lesions noted.    Lymphatic: no detected adenopathy in the upper or lower extremities.    Neurologic: normal mental status, normal reflexes, normal gait and balance.  Patient is alert and oriented to person, place and time.  No flaccidity or spasticity is noted.  No motor or sensory deficits are noted.  Light touch is intact    Orthopaedic:     SHOULDER EXAM - LEFT    Inspection:   Normal skin color and appearance with no scars.  No muscle atrophy noted.  No scapular winging.      Palpation: No tenderness of the acromioclavicular joint, lateral edge of the " acromion, biceps tendon, trapezius muscle or scapulothoracic bursa.      ROM:      PROM:     FE - 180°    Abd/ER -  90°  Abd/IR -  45°   Add/ER -  90°     AROM:    FE - 180°    Abd/ER -  90°  Abd/IR -  45°   Add/ER -  90    Tests:     - Aguirre, - Neer's, - Cross Arm Adduction, - [place], - [place], - Speed. - Belly Press,  - Jobes, - Lift Off    Stability: + sulcus, - apprehension, - relocation, - load and shift, - DLS  2+ anterior 2+ inferior and 3+ posterior load and shift    Motor:  Rotator cuff strength is 5/5 supraspinatus, 5/5 infraspinatus, 5/5 subscapularis. Biceps, triceps and deltoid strength is 5/5.      Neuro     Distally there are no paresthesias, and sensation is intact to light touch in the median, ulnar, and radial distributions.  Reflexes are 2/2 biceps, triceps and brachioradialis.      Beighton scale: 6/9    SHOULDER EXAM - RIGHT    Inspection:   Normal skin color and appearance with no scars.  No muscle atrophy noted.  No scapular winging.      Palpation: No tenderness of the acromioclavicular joint, lateral edge of the acromion, biceps tendon, trapezius muscle or scapulothoracic bursa.      ROM:      PROM:     FE - 180°    Abd/ER -  90°  Abd/IR -  45°   Add/ER -  80°     AROM:    FE - 180°    Abd/ER -  90°  Abd/IR -  45°   Add/ER -  80° 150 degrees hyper abduction         Tests:     - Aguirre, - Neer's, - Cross Arm Adduction, + [place], - [place], - Speed. - Belly Press,  - Jobes, - Lift Off    Stability: + sulcus, - apprehension, - relocation, - load and shift, - DLS  2+ anterior 2+ inferior and 3+ posterior load and shift    Motor:  Rotator cuff strength is 5/5 supraspinatus, 5/5 infraspinatus, 5/5 subscapularis. Biceps, triceps and deltoid strength is 5/5.      Neuro     Distally there are no paresthesias, and sensation is intact to light touch in the median, ulnar, and radial distributions.  Reflexes are 2/2 biceps, triceps and brachioradialis.        IMAGING    MRI Arthrogram Shoulder  With Contrast Left  Narrative: EXAMINATION:  MRI ARTHROGRAM SHOULDER WITH CONTRAST LEFT    CLINICAL HISTORY:  Shoulder pain, labral tear suspected, xray done;  Other instability, right shoulder    TECHNIQUE:  MRI left shoulder performed without contrast per arthrogram protocol.    COMPARISON:  X-ray arthrogram 01/17/2024.  Left shoulder radiograph 01/03/2024.    FINDINGS:  Rotator cuff: Mild tendinosis of the supraspinatus and anterior fibers of the infraspinatus without discrete tear.  Subscapularis and teres minor intact.  Muscle bulk is maintained.    Labrum: There is increased linear signal in the posterosuperior labrum (series 9, images 11-15), suggestive of a labral tear.  No labral displacement or adjacent cartilage injury.  No paralabral cyst.    Biceps: Normal in size, signal, and location.    Bone: There is no fracture. Bone marrow signal is unremarkable.    Acromioclavicular joint: Minimal AC joint arthrosis.  The acromion is curved, type II.  Trace subdeltoid/subacromial fluid.    Cartilage: Articular cartilage of the glenohumeral joint is preserved.    Miscellaneous: No additional findings.  Impression: Mild rotator cuff tendinosis, involving the supraspinatus and infraspinatus, as above.  No discrete tear/retraction.    Posterosuperior labral tear, as above.    Electronically signed by resident: Lennie Wolf  Date:    01/18/2024  Time:    08:14    Electronically signed by: Andrae Graf MD  Date:    01/18/2024  Time:    11:15      IMPRESSION       ICD-10-CM ICD-9-CM   1. Tear of left glenoid labrum, initial encounter  S43.432A 840.8   2. Instability of both shoulder joints  M25.311 718.81    M25.312          MEDICATIONS PRESCRIBED      Hydrocodone 7.5/325 mg    RECOMMENDATIONS     Surgical versus non-surgical options discussed today; left shoulder arthroscopy with posterior labrum repair and capsulorraphy  The patient elected to proceed with operative intervention after all risks, benefits, and  alternatives were discussed with the patient.  The risks of surgery include bleeding, scar formation, injuries to nerves, arteries and veins, need for additional surgeries, blood clots, infections, inability to return to the same level of the performance and the risk of anesthesia.   Informed consent was obtained  Physical therapy was ordered - [facility]  Fitted for sling today  Polar care ordered      All questions were answered, pt will contact us for questions or concerns in the interim.              No cyanosis, no pallor, no jaundice. Mild, diffuse erythematous rash.

## 2024-04-10 NOTE — H&P
H&P    History 4/10/2024:  Britt returns here today for follow-up evaluation of her left shoulder and to complete her preoperative paperwork.  She continues to have pain and functional limitations despite conservative treatment.  Surgical intervention has been recommended and she is scheduled to undergo a left shoulder arthroscopy with posterior labral repair and capsulorrhaphy on April 18, 2024.    History 2/26/2024:  I spoke with Kala today to discuss her MRA results of her left shoulder.    History 1/3/2023:  Kala returns to clinic today for follow-up of left shoulder pain. She states she was driving for a long time over the holidays. When she woke up she felt like her shoulder was out of place again. She has not been able to find relief.  She states she had tried physical therapy for months prior.  She states the pain is worse today.  She wants to consider further options.     History 5/29/2023:  aKla returns to clinic today for follow-up of bilateral shoulder pain. She states that her right shoulder is starting to bother her like her left shoulder. She reports her right shoulder coming out of place since last visit. She was going to physical therapy and is interested in continuing that. She also reports some right hip pain today.     History 1/28/2022:   33 y.o. 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 She has a dog that keeps her busy. 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 She states that she feels like the shoulder dislocates. She states she also has neck pain.   She states her left shoulder has slipped out about 3 times.  She states she is feeling get unstable in her sleep.    REVIEW OF SYSTEMS   Constitution: Negative. Negative for chills, fever and night sweats.   HENT: Negative for congestion and headaches.    Eyes: Negative for blurred vision, left vision loss and right vision loss.   Cardiovascular: Negative for chest pain and syncope.  "  Respiratory: Negative for cough and shortness of breath.    Endocrine: Negative for polydipsia, polyphagia and polyuria.   Hematologic/Lymphatic: Negative for bleeding problem. Does not bruise/bleed easily.   Skin: Negative for dry skin, itching and rash.   Musculoskeletal: Negative for falls. Positive for left shoulder pain  Gastrointestinal: Negative for abdominal pain and bowel incontinence.   Genitourinary: Negative for bladder incontinence and nocturia.   Neurological: Negative for disturbances in coordination, loss of balance and seizures.   Psychiatric/Behavioral: Negative for depression. The patient does not have insomnia.    Allergic/Immunologic: Negative for hives and persistent infections.     PHYSICAL EXAMINATION    Vitals: /77   Pulse 79   Ht 5' 7" (1.702 m)   Wt 63.2 kg (139 lb 5.3 oz)   BMI 21.82 kg/m²     General: The patient appears active and healthy with no apparent physical problems.  No disturbance of mood or affect is demonstrated. Alert and Oriented.    HEENT: Eyes normal, pupils equally round, nose normal.    Resp: Equal and symmetrical chest rises. No wheezing    CV: Regular rate    Neck: Supple; nonpainful range of motion.    Extremities: no cyanosis, clubbing, edema, or diffuse swelling.  Palpable pulses, good capillary refill of the digits.  No coolness, discoloration, edema or obvious varicosities.    Skin: no lesions noted.    Lymphatic: no detected adenopathy in the upper or lower extremities.    Neurologic: normal mental status, normal reflexes, normal gait and balance.  Patient is alert and oriented to person, place and time.  No flaccidity or spasticity is noted.  No motor or sensory deficits are noted.  Light touch is intact    Orthopaedic:     SHOULDER EXAM - LEFT    Inspection:   Normal skin color and appearance with no scars.  No muscle atrophy noted.  No scapular winging.      Palpation: No tenderness of the acromioclavicular joint, lateral edge of the acromion, " biceps tendon, trapezius muscle or scapulothoracic bursa.      ROM:      PROM:     FE - 180°    Abd/ER -  90°  Abd/IR -  45°   Add/ER -  90°     AROM:    FE - 180°    Abd/ER -  90°  Abd/IR -  45°   Add/ER -  90    Tests:     - Aguirre, - Neer's, - Cross Arm Adduction, - [place], - [place]erguson, - Speed. - Belly Press,  - Jobes, - Lift Off    Stability: + sulcus, - apprehension, - relocation, - load and shift, - DLS  2+ anterior 2+ inferior and 3+ posterior load and shift    Motor:  Rotator cuff strength is 5/5 supraspinatus, 5/5 infraspinatus, 5/5 subscapularis. Biceps, triceps and deltoid strength is 5/5.      Neuro     Distally there are no paresthesias, and sensation is intact to light touch in the median, ulnar, and radial distributions.  Reflexes are 2/2 biceps, triceps and brachioradialis.      Beighton scale: 6/9    SHOULDER EXAM - RIGHT    Inspection:   Normal skin color and appearance with no scars.  No muscle atrophy noted.  No scapular winging.      Palpation: No tenderness of the acromioclavicular joint, lateral edge of the acromion, biceps tendon, trapezius muscle or scapulothoracic bursa.      ROM:      PROM:     FE - 180°    Abd/ER -  90°  Abd/IR -  45°   Add/ER -  80°     AROM:    FE - 180°    Abd/ER -  90°  Abd/IR -  45°   Add/ER -  80° 150 degrees hyper abduction         Tests:     - Aguirre, - Neer's, - Cross Arm Adduction, + [place], - [place], - Speed. - Belly Press,  - Jobes, - Lift Off    Stability: + sulcus, - apprehension, - relocation, - load and shift, - DLS  2+ anterior 2+ inferior and 3+ posterior load and shift    Motor:  Rotator cuff strength is 5/5 supraspinatus, 5/5 infraspinatus, 5/5 subscapularis. Biceps, triceps and deltoid strength is 5/5.      Neuro     Distally there are no paresthesias, and sensation is intact to light touch in the median, ulnar, and radial distributions.  Reflexes are 2/2 biceps, triceps and brachioradialis.        IMAGING    MRI Arthrogram Shoulder With  Contrast Left  Narrative: EXAMINATION:  MRI ARTHROGRAM SHOULDER WITH CONTRAST LEFT    CLINICAL HISTORY:  Shoulder pain, labral tear suspected, xray done;  Other instability, right shoulder    TECHNIQUE:  MRI left shoulder performed without contrast per arthrogram protocol.    COMPARISON:  X-ray arthrogram 01/17/2024.  Left shoulder radiograph 01/03/2024.    FINDINGS:  Rotator cuff: Mild tendinosis of the supraspinatus and anterior fibers of the infraspinatus without discrete tear.  Subscapularis and teres minor intact.  Muscle bulk is maintained.    Labrum: There is increased linear signal in the posterosuperior labrum (series 9, images 11-15), suggestive of a labral tear.  No labral displacement or adjacent cartilage injury.  No paralabral cyst.    Biceps: Normal in size, signal, and location.    Bone: There is no fracture. Bone marrow signal is unremarkable.    Acromioclavicular joint: Minimal AC joint arthrosis.  The acromion is curved, type II.  Trace subdeltoid/subacromial fluid.    Cartilage: Articular cartilage of the glenohumeral joint is preserved.    Miscellaneous: No additional findings.  Impression: Mild rotator cuff tendinosis, involving the supraspinatus and infraspinatus, as above.  No discrete tear/retraction.    Posterosuperior labral tear, as above.    Electronically signed by resident: Lennie Wolf  Date:    01/18/2024  Time:    08:14    Electronically signed by: Andrae Graf MD  Date:    01/18/2024  Time:    11:15      IMPRESSION       ICD-10-CM ICD-9-CM   1. Tear of left glenoid labrum, initial encounter  S43.432A 840.8   2. Instability of both shoulder joints  M25.311 718.81    M25.312          MEDICATIONS PRESCRIBED      Hydrocodone 7.5/325 mg    RECOMMENDATIONS     Surgical versus non-surgical options discussed today; left shoulder arthroscopy with posterior labrum repair and capsulorraphy  The patient elected to proceed with operative intervention after all risks, benefits, and  alternatives were discussed with the patient.  The risks of surgery include bleeding, scar formation, injuries to nerves, arteries and veins, need for additional surgeries, blood clots, infections, inability to return to the same level of the performance and the risk of anesthesia.   Informed consent was obtained  Physical therapy was ordered - [facility]  Fitted for sling today  Polar care ordered      All questions were answered, pt will contact us for questions or concerns in the interim.

## 2024-04-10 NOTE — H&P (VIEW-ONLY)
H&P    History 4/10/2024:  Britt returns here today for follow-up evaluation of her left shoulder and to complete her preoperative paperwork.  She continues to have pain and functional limitations despite conservative treatment.  Surgical intervention has been recommended and she is scheduled to undergo a left shoulder arthroscopy with posterior labral repair and capsulorrhaphy on April 18, 2024.    History 2/26/2024:  I spoke with Kala today to discuss her MRA results of her left shoulder.    History 1/3/2023:  Kala returns to clinic today for follow-up of left shoulder pain. She states she was driving for a long time over the holidays. When she woke up she felt like her shoulder was out of place again. She has not been able to find relief.  She states she had tried physical therapy for months prior.  She states the pain is worse today.  She wants to consider further options.     History 5/29/2023:  Kala returns to clinic today for follow-up of bilateral shoulder pain. She states that her right shoulder is starting to bother her like her left shoulder. She reports her right shoulder coming out of place since last visit. She was going to physical therapy and is interested in continuing that. She also reports some right hip pain today.     History 1/28/2022:   33 y.o. 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 She has a dog that keeps her busy. 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 She states that she feels like the shoulder dislocates. She states she also has neck pain.   She states her left shoulder has slipped out about 3 times.  She states she is feeling get unstable in her sleep.    REVIEW OF SYSTEMS   Constitution: Negative. Negative for chills, fever and night sweats.   HENT: Negative for congestion and headaches.    Eyes: Negative for blurred vision, left vision loss and right vision loss.   Cardiovascular: Negative for chest pain and syncope.  "  Respiratory: Negative for cough and shortness of breath.    Endocrine: Negative for polydipsia, polyphagia and polyuria.   Hematologic/Lymphatic: Negative for bleeding problem. Does not bruise/bleed easily.   Skin: Negative for dry skin, itching and rash.   Musculoskeletal: Negative for falls. Positive for left shoulder pain  Gastrointestinal: Negative for abdominal pain and bowel incontinence.   Genitourinary: Negative for bladder incontinence and nocturia.   Neurological: Negative for disturbances in coordination, loss of balance and seizures.   Psychiatric/Behavioral: Negative for depression. The patient does not have insomnia.    Allergic/Immunologic: Negative for hives and persistent infections.     PHYSICAL EXAMINATION    Vitals: /77   Pulse 79   Ht 5' 7" (1.702 m)   Wt 63.2 kg (139 lb 5.3 oz)   BMI 21.82 kg/m²     General: The patient appears active and healthy with no apparent physical problems.  No disturbance of mood or affect is demonstrated. Alert and Oriented.    HEENT: Eyes normal, pupils equally round, nose normal.    Resp: Equal and symmetrical chest rises. No wheezing    CV: Regular rate    Neck: Supple; nonpainful range of motion.    Extremities: no cyanosis, clubbing, edema, or diffuse swelling.  Palpable pulses, good capillary refill of the digits.  No coolness, discoloration, edema or obvious varicosities.    Skin: no lesions noted.    Lymphatic: no detected adenopathy in the upper or lower extremities.    Neurologic: normal mental status, normal reflexes, normal gait and balance.  Patient is alert and oriented to person, place and time.  No flaccidity or spasticity is noted.  No motor or sensory deficits are noted.  Light touch is intact    Orthopaedic:     SHOULDER EXAM - LEFT    Inspection:   Normal skin color and appearance with no scars.  No muscle atrophy noted.  No scapular winging.      Palpation: No tenderness of the acromioclavicular joint, lateral edge of the acromion, " biceps tendon, trapezius muscle or scapulothoracic bursa.      ROM:      PROM:     FE - 180°    Abd/ER -  90°  Abd/IR -  45°   Add/ER -  90°     AROM:    FE - 180°    Abd/ER -  90°  Abd/IR -  45°   Add/ER -  90    Tests:     - Aguirre, - Neer's, - Cross Arm Adduction, - Maskell, - Yerguson, - Speed. - Belly Press,  - Jobes, - Lift Off    Stability: + sulcus, - apprehension, - relocation, - load and shift, - DLS  2+ anterior 2+ inferior and 3+ posterior load and shift    Motor:  Rotator cuff strength is 5/5 supraspinatus, 5/5 infraspinatus, 5/5 subscapularis. Biceps, triceps and deltoid strength is 5/5.      Neuro     Distally there are no paresthesias, and sensation is intact to light touch in the median, ulnar, and radial distributions.  Reflexes are 2/2 biceps, triceps and brachioradialis.      Beighton scale: 6/9    SHOULDER EXAM - RIGHT    Inspection:   Normal skin color and appearance with no scars.  No muscle atrophy noted.  No scapular winging.      Palpation: No tenderness of the acromioclavicular joint, lateral edge of the acromion, biceps tendon, trapezius muscle or scapulothoracic bursa.      ROM:      PROM:     FE - 180°    Abd/ER -  90°  Abd/IR -  45°   Add/ER -  80°     AROM:    FE - 180°    Abd/ER -  90°  Abd/IR -  45°   Add/ER -  80° 150 degrees hyper abduction         Tests:     - Aguirre, - Neer's, - Cross Arm Adduction, + Maskell, - Yerguson, - Speed. - Belly Press,  - Jobes, - Lift Off    Stability: + sulcus, - apprehension, - relocation, - load and shift, - DLS  2+ anterior 2+ inferior and 3+ posterior load and shift    Motor:  Rotator cuff strength is 5/5 supraspinatus, 5/5 infraspinatus, 5/5 subscapularis. Biceps, triceps and deltoid strength is 5/5.      Neuro     Distally there are no paresthesias, and sensation is intact to light touch in the median, ulnar, and radial distributions.  Reflexes are 2/2 biceps, triceps and brachioradialis.        IMAGING    MRI Arthrogram Shoulder With  Contrast Left  Narrative: EXAMINATION:  MRI ARTHROGRAM SHOULDER WITH CONTRAST LEFT    CLINICAL HISTORY:  Shoulder pain, labral tear suspected, xray done;  Other instability, right shoulder    TECHNIQUE:  MRI left shoulder performed without contrast per arthrogram protocol.    COMPARISON:  X-ray arthrogram 01/17/2024.  Left shoulder radiograph 01/03/2024.    FINDINGS:  Rotator cuff: Mild tendinosis of the supraspinatus and anterior fibers of the infraspinatus without discrete tear.  Subscapularis and teres minor intact.  Muscle bulk is maintained.    Labrum: There is increased linear signal in the posterosuperior labrum (series 9, images 11-15), suggestive of a labral tear.  No labral displacement or adjacent cartilage injury.  No paralabral cyst.    Biceps: Normal in size, signal, and location.    Bone: There is no fracture. Bone marrow signal is unremarkable.    Acromioclavicular joint: Minimal AC joint arthrosis.  The acromion is curved, type II.  Trace subdeltoid/subacromial fluid.    Cartilage: Articular cartilage of the glenohumeral joint is preserved.    Miscellaneous: No additional findings.  Impression: Mild rotator cuff tendinosis, involving the supraspinatus and infraspinatus, as above.  No discrete tear/retraction.    Posterosuperior labral tear, as above.    Electronically signed by resident: Lennie Wolf  Date:    01/18/2024  Time:    08:14    Electronically signed by: Andrae Graf MD  Date:    01/18/2024  Time:    11:15      IMPRESSION       ICD-10-CM ICD-9-CM   1. Tear of left glenoid labrum, initial encounter  S43.432A 840.8   2. Instability of both shoulder joints  M25.311 718.81    M25.312          MEDICATIONS PRESCRIBED      Hydrocodone 7.5/325 mg    RECOMMENDATIONS     Surgical versus non-surgical options discussed today; left shoulder arthroscopy with posterior labrum repair and capsulorraphy  The patient elected to proceed with operative intervention after all risks, benefits, and  alternatives were discussed with the patient.  The risks of surgery include bleeding, scar formation, injuries to nerves, arteries and veins, need for additional surgeries, blood clots, infections, inability to return to the same level of the performance and the risk of anesthesia.   Informed consent was obtained  Physical therapy was ordered - [facility]  Fitted for sling today  Polar care ordered      All questions were answered, pt will contact us for questions or concerns in the interim.

## 2024-04-16 ENCOUNTER — PATIENT MESSAGE (OUTPATIENT)
Dept: PREADMISSION TESTING | Facility: HOSPITAL | Age: 34
End: 2024-04-16
Payer: COMMERCIAL

## 2024-04-16 NOTE — ANESTHESIA PAT ROS NOTE
04/16/2024  Kala Raya is a 33 y.o., female.      Pre-op Assessment    I have reviewed the Patient Summary Reports.       I have reviewed the Medications.     Review of Systems  Anesthesia Hx:  No problems with previous Anesthesia               Denies Personal Hx of Anesthesia complications.                    Social:  Alcohol Use, Former Smoker Quit smoking in January, 2024.  Formerly smoked 1 ppd X 14   10 standard drinks of alcohol per week      Hematology/Oncology:    Oncology Normal    -- Denies Anemia:               Hematology Comments: B12 deficiency                    EENT/Dental:   H/O T&A          Cardiovascular:  Cardiovascular Normal Exercise tolerance: good       Denies CAD.     Denies Dysrhythmias.         Denies LUND.                            Pulmonary:  Pulmonary Normal    Denies Asthma.   Denies Shortness of breath.                  Renal/:  Renal/ Normal  Denies Chronic Renal Disease.                Hepatic/GI:      Denies GERD. Denies Liver Disease.  Appendectomy          Musculoskeletal:     Tear of left glenoid labrum,  Shoulder weakness,  Shoulder pain              OB/GYN/PEDS:  Breast cyst, left           Neurological:  Neurology Normal      Denies Headaches. Denies Seizures.                                Endocrine:  Endocrine Normal Denies Diabetes. Denies Hypothyroidism.          Psych:     Insomnia,              No past medical history on file.  Past Surgical History:   Procedure Laterality Date    APPENDECTOMY  2017    TONSILLECTOMY  2005    and adenoids       Anesthesia Assessment: Preoperative EQUATION    Planned Procedure: Procedure(s) (LRB):  ARTHROSCOPY, SHOULDER, WITH SLAP REPAIR- POLAR CARE (Left)  ARTHROSCOPY,SHOULDER,WITH CAPSULORRHAPHY (Left)  FIXATION, TENDON (Left)  Requested Anesthesia Type:General/Regional  Surgeon: Michaelle Sy MD  Service:  Orthopedics  Known or anticipated Date of Surgery:4/18/2024    Surgeon notes: reviewed    Electronic QUestionnaire Assessment completed via nurse interview with patient.        Triage considerations:     The patient has no apparent active cardiac condition (No unstable coronary Syndrome such as severe unstable angina or recent [<1 month] myocardial infarction, decompensated CHF, severe valvular   disease or significant arrhythmia)    Previous anesthesia records:No problems and Not available    Last PCP note: 6-12 months ago , within [facility]   Subspecialty notes: n/a    Other important co-morbidities: No co-morbidities noted.     Tests already available:  Results have been reviewed.             Instructions given. (See in Nurse's note)      Optimization:  Anesthesia Preop Clinic Assessment Not Indicated    Medical Opinion Indicated: No       Sub-specialist consult indicated:  No      Plan:   Consultation: Medical clearance is not requested.    Patient  has previously scheduled Medical Appointment:    Navigation: Tests Scheduled.              Consults scheduled.             No tests, anesthesia preop clinic visit, or consult required.                        Patient is OK to proceed with surgery at [facility].       Ht: 5'7  Wt: 63.2 kg (139 lb)  BMI: 21.82  Vaccinated

## 2024-04-17 ENCOUNTER — TELEPHONE (OUTPATIENT)
Dept: SPORTS MEDICINE | Facility: CLINIC | Age: 34
End: 2024-04-17
Payer: COMMERCIAL

## 2024-04-17 ENCOUNTER — PATIENT MESSAGE (OUTPATIENT)
Dept: SPORTS MEDICINE | Facility: CLINIC | Age: 34
End: 2024-04-17
Payer: COMMERCIAL

## 2024-04-17 ENCOUNTER — ANESTHESIA EVENT (OUTPATIENT)
Dept: SURGERY | Facility: HOSPITAL | Age: 34
End: 2024-04-17
Payer: COMMERCIAL

## 2024-04-18 ENCOUNTER — ANESTHESIA (OUTPATIENT)
Dept: SURGERY | Facility: HOSPITAL | Age: 34
End: 2024-04-18
Payer: COMMERCIAL

## 2024-04-18 ENCOUNTER — NURSE TRIAGE (OUTPATIENT)
Dept: ADMINISTRATIVE | Facility: CLINIC | Age: 34
End: 2024-04-18
Payer: COMMERCIAL

## 2024-04-18 ENCOUNTER — HOSPITAL ENCOUNTER (OUTPATIENT)
Facility: HOSPITAL | Age: 34
Discharge: HOME | End: 2024-04-18
Attending: ORTHOPAEDIC SURGERY | Admitting: ORTHOPAEDIC SURGERY
Payer: COMMERCIAL

## 2024-04-18 VITALS
WEIGHT: 137 LBS | TEMPERATURE: 99 F | BODY MASS INDEX: 21.5 KG/M2 | DIASTOLIC BLOOD PRESSURE: 62 MMHG | HEIGHT: 67 IN | OXYGEN SATURATION: 99 % | RESPIRATION RATE: 23 BRPM | SYSTOLIC BLOOD PRESSURE: 117 MMHG | HEART RATE: 71 BPM

## 2024-04-18 DIAGNOSIS — S43.432A TEAR OF LEFT GLENOID LABRUM, INITIAL ENCOUNTER: ICD-10-CM

## 2024-04-18 DIAGNOSIS — M25.311 INSTABILITY OF BOTH SHOULDER JOINTS: ICD-10-CM

## 2024-04-18 DIAGNOSIS — M25.312 INSTABILITY OF LEFT SHOULDER JOINT: Primary | ICD-10-CM

## 2024-04-18 DIAGNOSIS — M25.312 INSTABILITY OF BOTH SHOULDER JOINTS: ICD-10-CM

## 2024-04-18 LAB
B-HCG UR QL: NEGATIVE
CTP QC/QA: YES

## 2024-04-18 PROCEDURE — 63600175 PHARM REV CODE 636 W HCPCS: Performed by: NURSE ANESTHETIST, CERTIFIED REGISTERED

## 2024-04-18 PROCEDURE — 99900035 HC TECH TIME PER 15 MIN (STAT)

## 2024-04-18 PROCEDURE — 63600175 PHARM REV CODE 636 W HCPCS: Performed by: PHYSICIAN ASSISTANT

## 2024-04-18 PROCEDURE — 37000009 HC ANESTHESIA EA ADD 15 MINS: Performed by: ORTHOPAEDIC SURGERY

## 2024-04-18 PROCEDURE — 71000015 HC POSTOP RECOV 1ST HR: Performed by: ORTHOPAEDIC SURGERY

## 2024-04-18 PROCEDURE — 81025 URINE PREGNANCY TEST: CPT | Performed by: ORTHOPAEDIC SURGERY

## 2024-04-18 PROCEDURE — 27201423 OPTIME MED/SURG SUP & DEVICES STERILE SUPPLY: Performed by: ORTHOPAEDIC SURGERY

## 2024-04-18 PROCEDURE — 29806 SHO ARTHRS SRG CAPSULORRAPHY: CPT | Mod: LT,,, | Performed by: ORTHOPAEDIC SURGERY

## 2024-04-18 PROCEDURE — 25000003 PHARM REV CODE 250: Performed by: ANESTHESIOLOGY

## 2024-04-18 PROCEDURE — 25000003 PHARM REV CODE 250: Performed by: ORTHOPAEDIC SURGERY

## 2024-04-18 PROCEDURE — 94761 N-INVAS EAR/PLS OXIMETRY MLT: CPT

## 2024-04-18 PROCEDURE — 99499 UNLISTED E&M SERVICE: CPT | Mod: ,,, | Performed by: PHYSICIAN ASSISTANT

## 2024-04-18 PROCEDURE — 25000003 PHARM REV CODE 250: Performed by: PHYSICIAN ASSISTANT

## 2024-04-18 PROCEDURE — 71000033 HC RECOVERY, INTIAL HOUR: Performed by: ORTHOPAEDIC SURGERY

## 2024-04-18 PROCEDURE — 63600175 PHARM REV CODE 636 W HCPCS: Performed by: ANESTHESIOLOGY

## 2024-04-18 PROCEDURE — 36000710: Performed by: ORTHOPAEDIC SURGERY

## 2024-04-18 PROCEDURE — 36000711: Performed by: ORTHOPAEDIC SURGERY

## 2024-04-18 PROCEDURE — C1713 ANCHOR/SCREW BN/BN,TIS/BN: HCPCS | Performed by: ORTHOPAEDIC SURGERY

## 2024-04-18 PROCEDURE — 37000008 HC ANESTHESIA 1ST 15 MINUTES: Performed by: ORTHOPAEDIC SURGERY

## 2024-04-18 PROCEDURE — 25000003 PHARM REV CODE 250: Performed by: NURSE ANESTHETIST, CERTIFIED REGISTERED

## 2024-04-18 PROCEDURE — D9220A PRA ANESTHESIA: Mod: ANES,,, | Performed by: ANESTHESIOLOGY

## 2024-04-18 PROCEDURE — 64416 NJX AA&/STRD BRCH PL NFS IMG: CPT | Mod: 59 | Performed by: ANESTHESIOLOGY

## 2024-04-18 PROCEDURE — 63600175 PHARM REV CODE 636 W HCPCS: Performed by: ORTHOPAEDIC SURGERY

## 2024-04-18 PROCEDURE — D9220A PRA ANESTHESIA: Mod: CRNA,,, | Performed by: NURSE ANESTHETIST, CERTIFIED REGISTERED

## 2024-04-18 RX ORDER — OXYCODONE HYDROCHLORIDE 5 MG/1
5 TABLET ORAL
Status: DISCONTINUED | OUTPATIENT
Start: 2024-04-18 | End: 2024-04-18 | Stop reason: HOSPADM

## 2024-04-18 RX ORDER — ONDANSETRON HYDROCHLORIDE 2 MG/ML
INJECTION, SOLUTION INTRAVENOUS
Status: DISCONTINUED | OUTPATIENT
Start: 2024-04-18 | End: 2024-04-18

## 2024-04-18 RX ORDER — LIDOCAINE HYDROCHLORIDE 20 MG/ML
INJECTION INTRAVENOUS
Status: DISCONTINUED | OUTPATIENT
Start: 2024-04-18 | End: 2024-04-18

## 2024-04-18 RX ORDER — FENTANYL CITRATE 50 UG/ML
INJECTION, SOLUTION INTRAMUSCULAR; INTRAVENOUS
Status: DISCONTINUED | OUTPATIENT
Start: 2024-04-18 | End: 2024-04-18

## 2024-04-18 RX ORDER — ACETAMINOPHEN 500 MG
1000 TABLET ORAL
Status: COMPLETED | OUTPATIENT
Start: 2024-04-18 | End: 2024-04-18

## 2024-04-18 RX ORDER — DEXAMETHASONE SODIUM PHOSPHATE 4 MG/ML
INJECTION, SOLUTION INTRA-ARTICULAR; INTRALESIONAL; INTRAMUSCULAR; INTRAVENOUS; SOFT TISSUE
Status: DISCONTINUED | OUTPATIENT
Start: 2024-04-18 | End: 2024-04-18

## 2024-04-18 RX ORDER — KETAMINE HCL IN 0.9 % NACL 50 MG/5 ML
SYRINGE (ML) INTRAVENOUS
Status: DISCONTINUED | OUTPATIENT
Start: 2024-04-18 | End: 2024-04-18

## 2024-04-18 RX ORDER — METHOCARBAMOL 500 MG/1
1000 TABLET, FILM COATED ORAL ONCE
Status: COMPLETED | OUTPATIENT
Start: 2024-04-18 | End: 2024-04-18

## 2024-04-18 RX ORDER — ROCURONIUM BROMIDE 10 MG/ML
INJECTION, SOLUTION INTRAVENOUS
Status: DISCONTINUED | OUTPATIENT
Start: 2024-04-18 | End: 2024-04-18

## 2024-04-18 RX ORDER — ROPIVACAINE HYDROCHLORIDE 5 MG/ML
INJECTION, SOLUTION EPIDURAL; INFILTRATION; PERINEURAL
Status: COMPLETED | OUTPATIENT
Start: 2024-04-18 | End: 2024-04-18

## 2024-04-18 RX ORDER — PROPOFOL 10 MG/ML
VIAL (ML) INTRAVENOUS
Status: DISCONTINUED | OUTPATIENT
Start: 2024-04-18 | End: 2024-04-18

## 2024-04-18 RX ORDER — SODIUM CHLORIDE 9 MG/ML
INJECTION, SOLUTION INTRAVENOUS CONTINUOUS
Status: DISCONTINUED | OUTPATIENT
Start: 2024-04-18 | End: 2024-04-18 | Stop reason: HOSPADM

## 2024-04-18 RX ORDER — FENTANYL CITRATE 50 UG/ML
100 INJECTION, SOLUTION INTRAMUSCULAR; INTRAVENOUS
Status: DISCONTINUED | OUTPATIENT
Start: 2024-04-18 | End: 2024-04-18 | Stop reason: HOSPADM

## 2024-04-18 RX ORDER — MIDAZOLAM HYDROCHLORIDE 1 MG/ML
1 INJECTION, SOLUTION INTRAMUSCULAR; INTRAVENOUS
Status: DISCONTINUED | OUTPATIENT
Start: 2024-04-18 | End: 2024-04-18 | Stop reason: HOSPADM

## 2024-04-18 RX ORDER — MUPIROCIN 20 MG/G
OINTMENT TOPICAL
Status: DISCONTINUED | OUTPATIENT
Start: 2024-04-18 | End: 2024-04-18 | Stop reason: HOSPADM

## 2024-04-18 RX ORDER — ROPIVACAINE HYDROCHLORIDE 2 MG/ML
INJECTION, SOLUTION EPIDURAL; INFILTRATION; PERINEURAL CONTINUOUS
Status: DISCONTINUED | OUTPATIENT
Start: 2024-04-18 | End: 2024-04-18 | Stop reason: HOSPADM

## 2024-04-18 RX ORDER — EPINEPHRINE 1 MG/ML
INJECTION, SOLUTION, CONCENTRATE INTRAVENOUS
Status: DISCONTINUED | OUTPATIENT
Start: 2024-04-18 | End: 2024-04-18 | Stop reason: HOSPADM

## 2024-04-18 RX ORDER — FENTANYL CITRATE 50 UG/ML
25 INJECTION, SOLUTION INTRAMUSCULAR; INTRAVENOUS EVERY 5 MIN PRN
Status: DISCONTINUED | OUTPATIENT
Start: 2024-04-18 | End: 2024-04-18 | Stop reason: HOSPADM

## 2024-04-18 RX ORDER — BACITRACIN ZINC 500 UNIT/G
OINTMENT (GRAM) TOPICAL
Status: DISCONTINUED | OUTPATIENT
Start: 2024-04-18 | End: 2024-04-18 | Stop reason: HOSPADM

## 2024-04-18 RX ADMIN — DEXAMETHASONE SODIUM PHOSPHATE 12 MG: 4 INJECTION, SOLUTION INTRAMUSCULAR; INTRAVENOUS at 07:04

## 2024-04-18 RX ADMIN — Medication: at 09:04

## 2024-04-18 RX ADMIN — PROPOFOL 200 MG: 10 INJECTION, EMULSION INTRAVENOUS at 07:04

## 2024-04-18 RX ADMIN — ACETAMINOPHEN 1000 MG: 500 TABLET ORAL at 05:04

## 2024-04-18 RX ADMIN — SODIUM CHLORIDE, SODIUM GLUCONATE, SODIUM ACETATE, POTASSIUM CHLORIDE, MAGNESIUM CHLORIDE, SODIUM PHOSPHATE, DIBASIC, AND POTASSIUM PHOSPHATE: .53; .5; .37; .037; .03; .012; .00082 INJECTION, SOLUTION INTRAVENOUS at 07:04

## 2024-04-18 RX ADMIN — SODIUM CHLORIDE: 9 INJECTION, SOLUTION INTRAVENOUS at 06:04

## 2024-04-18 RX ADMIN — ROCURONIUM BROMIDE 50 MG: 10 INJECTION, SOLUTION INTRAVENOUS at 07:04

## 2024-04-18 RX ADMIN — DEXTROSE MONOHYDRATE 2 G: 50 INJECTION, SOLUTION INTRAVENOUS at 07:04

## 2024-04-18 RX ADMIN — ONDANSETRON 4 MG: 2 INJECTION INTRAMUSCULAR; INTRAVENOUS at 07:04

## 2024-04-18 RX ADMIN — Medication 20 MG: at 07:04

## 2024-04-18 RX ADMIN — METHOCARBAMOL 1000 MG: 500 TABLET ORAL at 09:04

## 2024-04-18 RX ADMIN — ROPIVACAINE HYDROCHLORIDE 10 ML: 5 INJECTION EPIDURAL; INFILTRATION; PERINEURAL at 07:04

## 2024-04-18 RX ADMIN — MUPIROCIN: 20 OINTMENT TOPICAL at 05:04

## 2024-04-18 RX ADMIN — LIDOCAINE HYDROCHLORIDE 60 MG: 20 INJECTION INTRAVENOUS at 07:04

## 2024-04-18 RX ADMIN — MIDAZOLAM HYDROCHLORIDE 4 MG: 1 INJECTION, SOLUTION INTRAMUSCULAR; INTRAVENOUS at 06:04

## 2024-04-18 RX ADMIN — FENTANYL CITRATE 50 MCG: 50 INJECTION, SOLUTION INTRAMUSCULAR; INTRAVENOUS at 07:04

## 2024-04-18 NOTE — PLAN OF CARE
Pre op complete. Patient resting comfortably. Call light in reach. Mother at bedside, belongings in locker. Waiting on completion of surgical consent, anesthesia consent, site britni, and H&P update.

## 2024-04-18 NOTE — OPERATIVE NOTE ADDENDUM
Certification of Assistant at Surgery       Surgery Date: 4/18/2024     Participating Surgeons:  Surgeons and Role:     * Michaelle Sy MD - Primary    Assistant:   Andrea Maciel PA-C    No resident or fellow was available throughout the entire procedure as a result it was medically necessary for Andrea Maciel PA-C to perform first assist duties.      Procedures:  Procedure(s) (LRB):  ARTHROSCOPY, SHOULDER, WITH SLAP REPAIR- [facility] (Left)  ARTHROSCOPY,SHOULDER,WITH CAPSULORRHAPHY (Left)    Assistant Surgeon's Certification of Necessity:  I understand that section 1842 (b) (6) (d) of the Social Security Act generally prohibits Medicare Part B reasonable charge payment for the services of assistants at surgery in teaching hospitals when qualified residents are available to furnish such services. I certify that the services for which payment is claimed were medically necessary, and that no qualified resident was available to perform the services. I further understand that these services are subject to post-payment review by the Medicare carrier.      Andrea Maciel PA-C    04/18/2024  8:48 AM

## 2024-04-18 NOTE — TELEPHONE ENCOUNTER
I have this pt calling in with a pump that's malfunctioning, She said that its beeping and saying cassette loading error. Are you available to answer any question. I sent this to Anesthesiologist to see if she can assist and also have the pt calling tech support with the company to see if they can walk her through getting it to work. I will call the pt back in 15 min to see what they found out. I called the pt back to let them know that anesthesia Manuel will be calling them to help assist. If she needs my help to call back Yessy and this was relayed to the father.                      Reason for Disposition   Nursing judgment    Protocols used: Information Only Call - No Triage-A-OH

## 2024-04-18 NOTE — ANESTHESIA PROCEDURE NOTES
L IS cath    Patient location during procedure: pre-op   Block not for primary anesthetic.  Reason for block: at surgeon's request and post-op pain management   Post-op Pain Location: L shoulder pain   Start time: 4/18/2024 6:57 AM  Timeout: 4/18/2024 6:55 AM   End time: 4/18/2024 7:05 AM    Staffing  Authorizing Provider: Yessy Cabrera MD  Performing Provider: Yessy Cabrera MD    Staffing  Performed by: Yessy Cabrera MD  Authorized by: Yessy Cabrera MD    Preanesthetic Checklist  Completed: patient identified, IV checked, site marked, risks and benefits discussed, surgical consent, monitors and equipment checked, pre-op evaluation and timeout performed  Peripheral Block  Patient position: sitting  Prep: ChloraPrep and site prepped and draped  Patient monitoring: heart rate, cardiac monitor, continuous pulse ox, continuous capnometry and frequent blood pressure checks  Block type: interscalene  Laterality: left  Injection technique: continuous  Needle  Needle type: Tuohy   Needle gauge: 17 G  Needle length: 2 in  Needle localization: anatomical landmarks and ultrasound guidance  Needle insertion depth: 1.5 cm  Catheter type: non-stimulating  Catheter size: 20 G  Catheter at skin depth: 8 cm  Test dose: lidocaine 1.5% with Epi 1-to-200,000 and negative   -ultrasound image captured on disc.  Assessment  Injection assessment: negative aspiration, negative parasthesia and local visualized surrounding nerve  Paresthesia pain: none  Heart rate change: no  Slow fractionated injection: yes  Pain Tolerance: no complaints and comfortable throughout block  Medications:    Medications: ropivacaine (NAROPIN) injection 0.5% - Perineural   10 mL - 4/18/2024 7:00:00 AM    Additional Notes  VSS.  [facility] RN monitoring vitals throughout procedure.  Patient tolerated procedure well.     20 cc 0.25% ropiv with 1: 400 k epi

## 2024-04-18 NOTE — ANESTHESIA POSTPROCEDURE EVALUATION
Anesthesia Post Evaluation    Patient: Kala Raya    Procedure(s) Performed: Procedure(s) (LRB):  ARTHROSCOPY, SHOULDER, WITH SLAP REPAIR- [facility] CARE (Left)  ARTHROSCOPY,SHOULDER,WITH CAPSULORRHAPHY (Left)    Final Anesthesia Type: general      Patient location during evaluation: PACU  Patient participation: Yes- Able to Participate  Level of consciousness: awake and alert  Post-procedure vital signs: reviewed and stable  Pain management: adequate  Airway patency: patent    PONV status at discharge: No PONV  Anesthetic complications: no      Cardiovascular status: blood pressure returned to baseline  Respiratory status: unassisted  Hydration status: euvolemic  Follow-up not needed.              Vitals Value Taken Time   /62 04/18/24 0931   Temp 36.9 °C (98.4 °F) 04/18/24 0850   Pulse 70 04/18/24 0948   Resp 29 04/18/24 0948   SpO2 98 % 04/18/24 0948   Vitals shown include unfiled device data.      Event Time   Out of Recovery 09:10:00         Pain/Lara Score: Pain Rating Prior to Med Admin: 0 (4/18/2024  9:04 AM)  Pain Rating Post Med Admin: 0 (4/18/2024  7:10 AM)  Lara Score: 10 (4/18/2024  8:55 AM)

## 2024-04-18 NOTE — OP NOTE
DATE OF PROCEDURE: 4/18/2024    ATTENDING SURGEON: Surgeons and Role:     * Michaelle Sy MD - Primary    Assistant: Andrea Maciel PA-C    No resident or fellow was available throughout the entire procedure as a result it was medically necessary for Andrea Maciel PA-C to perform first assist duties.     PREOPERATIVE DIAGNOSIS:  Left shoulder Shoulder Instability M25.319   Left shoulder  posterior labral tear  with associated posterior shoulder instability    POSTOPERATIVE DIAGNOSIS:   Left shoulder Shoulder Instability M25.319   Left shoulder posterior labral tear with associated posterior shoulder instability    PROCEDURES PERFORMED:  1. Left shoulder Arthroscopic posterior labral repair with capsulorrhaphy CPT - 70970      Anesthesia: General/Regional    Complications: None    Implants:   Implant Name Type Inv. Item Serial No.  Lot No. LRB No. Used Action   ANCHOR SUT FIBERTAK 1.8 KNTLS - [id number]  ANCHOR SUT FIBERTAK 1.8 KNTLS  [employer] [id number] Left 5 Implanted       Arthroscopic Findings:   Evaluation of the glenohumeral joint demonstrated a small posterior labral tear from proximally the 1 o'clock position extending down to the 4 o'clock position.  No other significant labral pathology was noted.  No articular sided rotator cuff pathology.  No chondral lesions noted    Exam Under Anesthesia:  She was able to be dislocated posteriorly fairly easily.  No ability to inferiorly or anteriorly dislocate the shoulder.  This was asymmetric to her right upper extremity.  The right upper extremity was unable to be dislocated posteriorly.    Indication for Procedure:  The patient is a 33-year-old female with a long history of chronic left shoulder pain with history of multiple subluxation versus dislocation events of the shoulder.  She had been treating this nonsurgical for many years.  I would seen her back in 2022 and did recommend a surgery at that time.  She continued to treated nonoperatively  into 2024.  An MR arthrogram was consistent with a posterior superior labral tear.  We discussed the risks, benefits and alternatives of surgery.  She elected proceed surgical intervention after failing extensive nonsurgical management.  We discussed her history of hyperlaxity and how contributory this was in combination with a labral tear.    Surgery in Detail:  The patient was marked identified in the holding room.  She was brought back to the operating room and placed in the lateral decubitus position.  After general endotracheal anesthesia was administered the left upper extremity was prepped and draped in usual sterile fashion for a shoulder arthroscopy. The body was secured and well padded in a bean bag. Preoperative antibiotics were given within 1 hour the procedure.  A time-out was taken prior starting.  A posterior portal was created after insufflation of the joint with sterile saline.  A diagnostic arthroscopy was performed with the above-stated findings.    Arthroscopic Posterior Labral Repair  Under needle localization, an anterior superior and anterior inferior portal were created and cannulas were introduced.  Darlene were brought into the joint and debridement was done of the posterior labrum was completed.  Elevators were then used to free the posterior labral tear from the glenoid for mobilization.  This was released all the way from approximately the 1 o'clock position to the 4  o'clock position.We then used a 4.5 bone cutter to remove all soft tissue from the glenoid and to create a bleeding bed for healing.  We placed 4 sequential anchors from the 4  position extending superiorly along the face of the glenoid through an accessory percutaneous posterolateral portal.  The labrum was then secured with the assistance of a Spectrum suture Lasso at each anchor as we sequentially repaired the labral tear.  This gave us a good buttress and good reduction of the labrum back to the glenoid. The  arthroscopes were removed from the joint and sterile dressings were applied. The patient was extubated and taken recovery in satisfactory condition.    Post-Op Plan:  Posterior labral repair    Attestation: I was present and scrubbed for the entire procedure.

## 2024-04-18 NOTE — BRIEF OP NOTE
[facility]  Brief Operative Note    Surgery Date: 4/18/2024     Surgeon(s) and Role:     * Michaelle Sy MD - Primary    Assisting Provider:     * Andrea Maciel PA-C - First Assist    No resident or fellow was available throughout the entire procedure as a result it was medically necessary for Andrea Maciel PA-C to perform first assistant duties.    Pre-Operative Diagnosis: Tear of left glenoid labrum, initial encounter [S43.432A]    Post-Operative Diagnosis: Post-Op Diagnosis Codes:     * Tear of left glenoid labrum, initial encounter [S43.432A]    Procedure(s) (LRB):  ARTHROSCOPY, SHOULDER, WITH SLAP REPAIR- POLAR CARE (Left)  ARTHROSCOPY,SHOULDER,WITH CAPSULORRHAPHY (Left)    Anesthesia: General    Operative Findings: See Op note.    Estimated Blood Loss: * No values recorded between 4/18/2024  7:40 AM and 4/18/2024  8:39 AM *         Specimens:   Specimen (24h ago, onward)      None              Discharge Note    OUTCOME: Patient tolerated treatment/procedure well without complication and is now ready for discharge.    DISPOSITION: Home or Self Care    FINAL DIAGNOSIS:  Post-Op Diagnosis Codes:     * Tear of left glenoid labrum, initial encounter [S43.432A]    FOLLOWUP: In clinic    DISCHARGE INSTRUCTIONS: See attached.

## 2024-04-18 NOTE — TRANSFER OF CARE
"Anesthesia Transfer of Care Note    Patient: Kala Raya    Procedure(s) Performed: Procedure(s) (LRB):  ARTHROSCOPY, SHOULDER, WITH SLAP REPAIR- POL[facility] CARE (Left)  ARTHROSCOPY,SHOULDER,WITH CAPSULORRHAPHY (Left)    Patient location: PACU    Anesthesia Type: general and regional    Transport from OR: Transported from OR on 6-10 L/min O2 by face mask with adequate spontaneous ventilation    Post pain: adequate analgesia    Post assessment: no apparent anesthetic complications and tolerated procedure well    Post vital signs: stable    Level of consciousness: awake, alert and oriented    Nausea/Vomiting: no nausea/vomiting    Complications: none    Transfer of care protocol was followed      Last vitals: Visit Vitals  /73 (BP Location: Right arm, Patient Position: Lying)   Pulse 65   Temp 37 °C (98.6 °F) (Temporal)   Resp 15   Ht 5' 7" (1.702 m)   Wt 62.1 kg (137 lb)   LMP 03/18/2024 (Exact Date)   SpO2 100%   Breastfeeding No   BMI 21.46 kg/m²     "

## 2024-04-18 NOTE — ANESTHESIA PROCEDURE NOTES
Intubation    Date/Time: 4/18/2024 7:24 AM    Performed by: Renetta Grant CRNA  Authorized by: Yessy Cabrera MD    Intubation:     Induction:  Intravenous    Intubated:  Postinduction    Mask Ventilation:  Easy mask    Attempts:  1    Attempted By:  CRNA    Method of Intubation:  Video laryngoscopy    Blade:  Covarrubias 3    Laryngeal View Grade: Grade I - full view of cords      Difficult Airway Encountered?: No      Complications:  None    Airway Device:  Oral endotracheal tube    Airway Device Size:  7.0    Style/Cuff Inflation:  Cuffed    Tube secured:  21    Secured at:  The lips    Placement Verified By:  Capnometry    Complicating Factors:  None    Findings Post-Intubation:  BS equal bilateral and atraumatic/condition of teeth unchanged

## 2024-04-18 NOTE — DISCHARGE INSTRUCTIONS
POST-OPERATIVE DISCHARGE INSTRUCTIONS    Diet: Ice chips, clear liquids, and then diet as tolerated.  Drink plenty of liquids.  Ice the area at least three times a day (20 minutes per session).    Elevate the extremity above the level of the heart to help reduce swelling.  Keep arm in sling and remain non-weightbearing until further notice.  Pain medication can be taken every four to six hours as needed.  It is helpful to take pain medication prior to physical therapy.  Any activity that requires precise thinking or accuracy should be avoided for a minimum of 72 hours after surgery and while on narcotic pain medication.  This includes operating machinery and/or driving a vehicle.  All sutures/staples will be removed approximately 14 days from the time of surgery. Leave steri-strips (skin tapes) in place until sutures are removed.  If skin glue is used instead of stitches, do not apply any ointments or solutions to the incision.  Keep the incision dry.  The skin glue will peel off in 3-4 weeks.  Dressing change directions, unless otherwise instructed:     ? Shoulder scope:  Remove dressings 48 hours after surgery and start using waterproof Band-Aids over the incision sites.      All casts, splints, braces, slings, crutches, abduction pillows, etc. are to be worn as instructed.    Dylan Hose are often used following knee surgery to help with swelling.  They can be removed for 2-3 hours daily as needed.  If the hose become uncomfortable after a few days, switch to an Ace Wrap if desired.  Keep the incision dry for 10-14 days.  A waterproof dressing ([facility] or [facility]) can be used to shower.  No bath, pool, or hot tub until instructed.  You should notify our office if you notice any of the following:  A temperature greater than 101 F  Severe increased pain  Excessive drainage or redness of the incision  Calf swelling and pain  Chest pain  Your post-op follow up appointment will be approximately 14 days from the time of  surgery.  If you do not have an appointment scheduled, please call our office and schedule within 1-2 days.   If you have any problems or questions, please call our office at [phone].

## 2024-04-18 NOTE — ANESTHESIA PREPROCEDURE EVALUATION
"                                                                                                             04/18/2024  Kala Raya is a 33 y.o., female.    Pre-operative evaluation for Procedure(s) (LRB):  ARTHROSCOPY, SHOULDER, WITH SLAP REPAIR- POLAR CARE (Left)  ARTHROSCOPY,SHOULDER,WITH CAPSULORRHAPHY (Left)  FIXATION, TENDON (Left)    Kala Raya is a 33 y.o. female     Patient Active Problem List   Diagnosis    Shoulder weakness    Shoulder pain    Breast cyst, left       Review of patient's allergies indicates:   Allergen Reactions    Bupropion hcl Hives       No current facility-administered medications on file prior to encounter.     Current Outpatient Medications on File Prior to Encounter   Medication Sig Dispense Refill    desogestreL-ethinyl estradioL (ISIBLOOM) 0.15-0.03 mg per tablet Take 1 tablet by mouth once daily. 90 tablet 4    ibuprofen (ADVIL,MOTRIN) 200 MG tablet Take 200 mg by mouth every 6 (six) hours as needed for Pain.      traZODone (DESYREL) 50 MG tablet Take 1-2 tabs as needed for insomnia 30 tablet 6       Past Surgical History:   Procedure Laterality Date    APPENDECTOMY  2017    TONSILLECTOMY  2005    and adenoids         CBC:  No results found for: "WBC", "RBC", "HGB", "HCT", "PLT", "MCV", "MCH", "MCHC"    CMP:   No results found for: "NA", "K", "CL", "CO2", "BUN", "CREATININE", "GLU", "MG", "PHOS", "CALCIUM", "ALBUMIN", "PROT", "ALKPHOS", "ALT", "AST", "BILITOT"    INR:  No results found for: "PT", "INR", "PROTIME", "APTT"      Diagnostic Studies:      EKG:   No results found for this or any previous visit.     2D Echo:  No results found for this or any previous visit.    Stress Test:   No results found for this or any previous visit.      Pre-op Vitals [04/18/24 0544]   BP Pulse Resp Temp SpO2   124/60 63 16 37 °C (98.6 °F) 100 %      Height Weight BMI (Calculated)     5' 7" 137 lb 21.5         Pre-op Vitals [04/18/24 0544]   BP Pulse Resp Temp SpO2   124/60 63 16 37 °C " "(98.6 °F) 100 %      Height Weight BMI (Calculated)     5' 7" 137 lb 21.5          Pre-op Assessment          Review of Systems      Physical Exam  General: Well nourished    Airway:  Mallampati: I / I  Mouth Opening: Normal  TM Distance: Normal  Tongue: Normal  Neck ROM: Normal ROM    Dental:  Intact    Chest/Lungs:  Clear to auscultation    Heart:  Rate: Normal  Rhythm: Regular Rhythm  Sounds: Normal        Anesthesia Plan  Type of Anesthesia, risks & benefits discussed:    Anesthesia Type: MAC, Gen ETT, Gen Supraglottic Airway, Gen Natural Airway, Regional  Intra-op Monitoring Plan: Standard ASA Monitors  Post Op Pain Control Plan: multimodal analgesia and peripheral nerve block  Induction:  IV  Airway Plan: Video and Direct  Informed Consent: Informed consent signed with the Patient and all parties understand the risks and agree with anesthesia plan.  All questions answered.   ASA Score: 2  Day of Surgery Review of History & Physical: H&P Update referred to the surgeon/provider.    Ready For Surgery From Anesthesia Perspective.     .      "

## 2024-04-19 ENCOUNTER — TELEPHONE (OUTPATIENT)
Dept: SPORTS MEDICINE | Facility: CLINIC | Age: 34
End: 2024-04-19
Payer: COMMERCIAL

## 2024-04-19 ENCOUNTER — NURSE TRIAGE (OUTPATIENT)
Dept: ADMINISTRATIVE | Facility: CLINIC | Age: 34
End: 2024-04-19
Payer: COMMERCIAL

## 2024-04-19 NOTE — TELEPHONE ENCOUNTER
----- Message from Artemio Bangura MA sent at 4/19/2024  4:26 PM CDT -----  Regarding: post op appt  Contact: pt at [phone]  Pt  mom is calling to speak with someone in provider office is asking for a return call concern that post op appt is too soon please call pt at [phone]

## 2024-04-19 NOTE — ANESTHESIA POST-OP PAIN MANAGEMENT
Acute Pain Service Progress Note    4/18/24 0310    Received notification that patient needs assistance related to Nimbus pump. Contacted patient. Reports that the loading cassette for the pump tubing had popped off and needs to be reconnected. Walked patient through the steps of getting the cassette loaded and pump was restarted. Bolus dose successfully given without issue. Instructed patient to notify anesthesia at [phone] if this issue reoccurs. Verbalizes understanding. Will continue to follow up.       Call made to patient's son. I reviewed the doctor's message with him (see below). He verbalized understanding. No further questions at this time.

## 2024-04-20 NOTE — TELEPHONE ENCOUNTER
She a shoulder surgery yesterday morning. This evening she bumped the cast slightly and had pain for about 30 minutes and nerve block machine kicked in and she felt better. Now she is falling asleep. States that she was in severe pain but is better now.  Reason for Disposition   [1] Caller has NON-URGENT question AND [2] triager unable to answer question    Additional Information   Negative: Sounds like a life-threatening emergency to the triager   Negative: Chest pain   Negative: Difficulty breathing   Negative: Acting confused (e.g., disoriented, slurred speech) or excessively sleepy   Negative: Post-Op tonsil and adenoid surgery, symptoms or questions about   Negative: Surgical incision symptoms and questions   Negative: [1] Pain or burning with passing urine (urination) AND [2] male   Negative: [1] Pain or burning with passing urine (urination) AND [2] female   Negative: Constipation   Negative: New or worsening leg (calf, thigh) pain   Negative: New or worsening leg swelling   Negative: Dizziness is severe, or persists > 24 hours after surgery   Negative: Pain, redness, swelling, or pus at IV Site   Negative: Symptoms arising from use of a urinary catheter (e.g., Coude, Garzon)   Negative: Cast problems or questions   Negative: Medication question   Negative: [1] Widespread rash AND [2] bright red, sunburn-like   Negative: [1] SEVERE headache AND [2] after spinal (epidural) anesthesia   Negative: [1] Vomiting AND [2] persists > 4 hours   Negative: [1] Vomiting AND [2] abdomen looks much more swollen than usual   Negative: [1] Drinking very little AND [2] dehydration suspected (e.g., no urine > 12 hours, very dry mouth, very lightheaded)   Negative: Patient sounds very sick or weak to the triager   Negative: Sounds like a serious complication to the triager   Negative: Fever > 100.4 F (38.0 C)   Negative: [1] SEVERE post-op pain (e.g., excruciating, pain scale 8-10) AND [2] not  controlled with pain medications   Negative: [1] Caller has URGENT question AND [2] triager unable to answer question   Negative: [1] Headache AND [2] after spinal (epidural) anesthesia AND [3] not severe   Negative: Fever present > 3 days (72 hours)   Negative: [1] MILD-MODERATE post-op pain (e.g., pain scale 1-7) AND [2] not controlled with pain medications    Protocols used: Post-Op Symptoms and [id number]

## 2024-04-22 ENCOUNTER — CLINICAL SUPPORT (OUTPATIENT)
Dept: REHABILITATION | Facility: HOSPITAL | Age: 34
End: 2024-04-22
Payer: COMMERCIAL

## 2024-04-22 DIAGNOSIS — R29.898 SHOULDER WEAKNESS: Primary | ICD-10-CM

## 2024-04-22 DIAGNOSIS — G89.29 CHRONIC PAIN OF BOTH SHOULDERS: ICD-10-CM

## 2024-04-22 DIAGNOSIS — M25.312 INSTABILITY OF BOTH SHOULDER JOINTS: ICD-10-CM

## 2024-04-22 DIAGNOSIS — M25.512 ACUTE PAIN OF LEFT SHOULDER: ICD-10-CM

## 2024-04-22 DIAGNOSIS — M25.512 CHRONIC PAIN OF BOTH SHOULDERS: ICD-10-CM

## 2024-04-22 DIAGNOSIS — M25.511 CHRONIC PAIN OF BOTH SHOULDERS: ICD-10-CM

## 2024-04-22 DIAGNOSIS — M25.311 INSTABILITY OF BOTH SHOULDER JOINTS: ICD-10-CM

## 2024-04-22 DIAGNOSIS — S43.432A TEAR OF LEFT GLENOID LABRUM, INITIAL ENCOUNTER: ICD-10-CM

## 2024-04-22 PROCEDURE — 97161 PT EVAL LOW COMPLEX 20 MIN: CPT

## 2024-04-22 PROCEDURE — 97530 THERAPEUTIC ACTIVITIES: CPT

## 2024-04-22 PROCEDURE — 97110 THERAPEUTIC EXERCISES: CPT

## 2024-04-22 PROCEDURE — 97112 NEUROMUSCULAR REEDUCATION: CPT

## 2024-04-22 NOTE — ANESTHESIA POST-OP PAIN MANAGEMENT
Acute Pain Service Progress Note    4/22/2024 1346    Unable to reach patient for Nimbus follow up. Voicemail left on patient's cell number encouraging to contact anesthesia at [phone] or Nimbus 24/7 support line at [phone] for any questions or concerns about the nerve block or infusion pump. Nimbus due to be discontinued by tomorrow at noon.

## 2024-04-23 PROBLEM — M25.512 ACUTE PAIN OF LEFT SHOULDER: Status: ACTIVE | Noted: 2024-04-23

## 2024-04-23 NOTE — PLAN OF CARE
[facility]   Physical Therapy Initial Evaluation      Name: Kala Raya  Clinic Number: [id number]    Therapy Diagnosis:   Encounter Diagnoses   Name Primary?    Tear of left glenoid labrum, initial encounter     Instability of both shoulder joints     Shoulder weakness Yes    Chronic pain of both shoulders     Acute pain of left shoulder        Physician: Andrea Maciel PA-C    Physician Orders: PT Eval and Treat   Medical Diagnosis from Referral:   S43.432A (ICD-10-CM) - Tear of left glenoid labrum, initial encounter   M25.311,M25.312 (ICD-10-CM) - Instability of both shoulder joints     Evaluation Date: 4/22/2024  Authorization Period Expiration: 12/31/24  Plan of Care Expiration: 12/31/24  Progress Note Due: 5/22/24  Date of Surgery: 4/18/24  Visit # / Visits authorized: 1/ 1   FOTO: 1/ 3    Precautions: Standard   Procedure(s) (LRB):  ARTHROSCOPY, SHOULDER, WITH SLAP REPAIR- POLAR CARE (Left)  ARTHROSCOPY,SHOULDER,WITH CAPSULORRHAPHY (Left)  Post-Op Plan:  Posterior labral repair - Dr. Sy        Time In: 11:00am  Time Out: 12:00pm  Total Billable Time: 60 minutes    Subjective     Date of onset: Last few years    History of current condition - Kala reports: States she has been having shoulder dislocations for as long as she can remember, but more recently in the last 4-5 years. Her most recent dislocation was more painful and then sought out surgery.    Falls: none    Imaging: MRI studies: see chart    Prior Therapy: has had prior PT for shoulder weakness  Social History:  lives with their family  Occupation: works at [employer]  Prior Level of Function: Able to perform functional activities and ADLs, but was having dislocations every so often   Current Level of Function: Currently post-op, limited to ADLs, functional activities due to restrictions    Pain:  Current 0/10  Location: left shoulder  left shoulder(s)  Description: Aching, Dull, and Throbbing  Aggravating Factors:  currently post op   Easing Factors: pain medication, ice    Patients goals: To get shoulder back to functioning normal, being able to walk her dogs     Medical History:   No past medical history on file.    Surgical History:   Kala Raya  has a past surgical history that includes Appendectomy (2017); Tonsillectomy (2005); Shoulder arthroscopy w/ superior labral anterior posterior lesion repair (Left, 4/18/2024); and arthroscopy, shoulder, with capsulorrhaphy (Left, 4/18/2024).    Medications:   Kala has a current medication list which includes the following prescription(s): desogestrel-ethinyl estradiol, hydrocodone-acetaminophen, ibuprofen, and trazodone.    Allergies:   Review of patient's allergies indicates:   Allergen Reactions    Bupropion hcl Hives        Objective    DOS: 4/18/24  POD: 4 days    Observation: Pt alert and oriented. Incision site areas look good, no signs of redness, edema or infection.    Posture: slight winging of scapulas, pt in shoulder sling - donned appropriately    Passive Range of Motion:   Shoulder Left   Flexion Not measured    Abduction Not measured due to post op protocol   ER at 20 20   IR Not measured due to postop protocol      Active Range of Motion: not measured 2/2 POD 4 days  Shoulder Right Left   Flexion NT NT   Abduction NT NT   ER at 0 NT NT   ER at 90 NT NT   IR (behind back) NT NT     Upper Extremity Strength:  Formal MMT not performed 2/2 POD#4 days and increased pain.    Joint Mobility: hypomobile as expected post-op.    Palpation:  tender to palpation as expected post-op, slight warmth around incisions    Sensation: Intact but diminished 2/2 residual nerve block.      Intake Outcome Measure for FOTO Shoulder Survey    Therapist reviewed FOTO scores for Kala Raya on 4/22/2024.   FOTO report - see Media section or FOTO account episode details.    Intake Score: not taken today, will assess next session         Treatment     Total Treatment time (time-based  codes) separate from Evaluation: 30 minutes     Kala received the treatments listed below:      therapeutic exercises to develop strength, endurance, and ROM for 08 minutes including:    Elbow flexion/ext AROM x10  Wrist flex/ext AROM x10    manual therapy techniques: Joint mobilizations and Soft tissue Mobilization were applied to the: Shoulder for 00 minutes, including:    Not performed today    neuromuscular re-education activities to improve: Balance, Coordination, and Proprioception for 10 minutes. The following activities were included:    AAROM ER at 0 c dowel x10  Table slides x10  Scap Squeezes 3-5s hold x10      therapeutic activities to improve functional performance for 12 minutes, including:    Pt education on dressing, sling management, S/S of infection, precautions, mvmts to avoid      Patient Education and Home Exercises     Education provided:   - Plan of Care  - HEP  - Precautions, signs and symptoms of infection    Written Home Exercises Provided: yes. Exercises were reviewed and Kala was able to demonstrate them prior to the end of the session.  Kala demonstrated good  understanding of the education provided. See EMR under Patient Instructions for exercises provided during therapy sessions.    Assessment     Kala is a 33 y.o. female referred to outpatient Physical Therapy with a medical diagnosis of tear of L glenoid labrum and instability of both shoulder joints. Patient presents 4 days post-op L shoulder SLAP repair and capsulorraphy. Pt presents with decreased ROM, strength, flexibility, and motor control of L UE as expected post-op. Removed pt pain pump without issues today. Pt denies any recent fever, sweats/night chills, nor yellow/abnormal discharge from incisions. Educated on red flag symptoms, bathing, pain management and sling management and movements to avoid during this phase of healing. Pt demos good understanding of education and HEP.     Patient prognosis is Good.   Patient  will benefit from skilled outpatient Physical Therapy to address the deficits stated above and in the chart below, provide patient /family education, and to maximize patientt's level of independence.     Plan of care discussed with patient: Yes  Patient's spiritual, cultural and educational needs considered and patient is agreeable to the plan of care and goals as stated below:     Anticipated Barriers for therapy: none    Medical Necessity is demonstrated by the following  History  Co-morbidities and personal factors that may impact the plan of care [x] LOW: no personal factors / co-morbidities  [] MODERATE: 1-2 personal factors / co-morbidities  [] HIGH: 3+ personal factors / co-morbidities    Moderate / High Support Documentation:   Co-morbidities affecting plan of care: none    Personal Factors:   no deficits     Examination  Body Structures and Functions, activity limitations and participation restrictions that may impact the plan of care [x] LOW: addressing 1-2 elements  [] MODERATE: 3+ elements  [] HIGH: 4+ elements (please support below)    Moderate / High Support Documentation: none     Clinical Presentation [x] LOW: stable  [] MODERATE: Evolving  [] HIGH: Unstable     Decision Making/ Complexity Score: low       Goals:  Short Term Goals: 12 weeks  1.Report decreased L shoulder pain < / =  3-4/10 at worst to increase tolerance for functional activities and ADLs.   2. Increase PROM 90% of unaffected side or greater  3. Increased strength by 1/3 MMT grade in areas of deficit to increase tolerance for ADL and work activities.  4. Pt to tolerate HEP to improve ROM and independence with ADL's    Long Term Goals: 24 weeks  1.Report decreased L shoulder pain  < / =  1-2 /10 at worst  to increase tolerance for functional activities and ADLs.  2.Increase AROM to within functional limits in order to optimize performance for functional activities.  3.Increase strength to >/= 4/5 in areas of limitations to increase  tolerance for ADL and work activities.  4. Pt goal: To be able to walk dogs and get shoulder back to normal  5. Pt will have improved gcode of CJ (20-40% limited) on FOTO shoulder in order to demonstrate true functional improvement.    Plan     Plan of care Certification: 4/22/2024 to 12/31/2024.    Outpatient Physical Therapy 1-2 times weekly for 21-32 weeks to include the following interventions: Manual Therapy, Neuromuscular Re-ed, Patient Education, Therapeutic Activities, and Therapeutic Exercise.     Jose Mehta, PT  Co treated by Joy MCDONNELL   I certify that I was present in the room directing the student in service delivery and guiding them using my skilled judgment.  As the co-signing therapist I have reviewed the students documentation and am responsible for the treatment, assessment, and plan          Physician's Signature: _________________________________________ Date: ________________

## 2024-04-24 ENCOUNTER — PATIENT MESSAGE (OUTPATIENT)
Dept: SPORTS MEDICINE | Facility: CLINIC | Age: 34
End: 2024-04-24
Payer: COMMERCIAL

## 2024-04-24 ENCOUNTER — CLINICAL SUPPORT (OUTPATIENT)
Dept: REHABILITATION | Facility: HOSPITAL | Age: 34
End: 2024-04-24
Payer: COMMERCIAL

## 2024-04-24 DIAGNOSIS — M25.512 ACUTE PAIN OF LEFT SHOULDER: Primary | ICD-10-CM

## 2024-04-24 PROCEDURE — 97110 THERAPEUTIC EXERCISES: CPT

## 2024-04-24 PROCEDURE — 97112 NEUROMUSCULAR REEDUCATION: CPT

## 2024-04-24 PROCEDURE — 97140 MANUAL THERAPY 1/> REGIONS: CPT

## 2024-04-24 NOTE — PROGRESS NOTES
[facility]   Physical Therapy Treatment Note      Name: Kala Raya  Clinic Number: [id number]    Therapy Diagnosis:   Therapy Diagnosis:        Encounter Diagnoses   Name Primary?    Tear of left glenoid labrum, initial encounter      Instability of both shoulder joints      Shoulder weakness Yes    Chronic pain of both shoulders      Acute pain of left shoulder      Physician: Andrea Maciel PA-C    Visit Date: 4/24/2024    Physician: Andrea Maciel PA-C     Physician Orders: PT Eval and Treat   Medical Diagnosis from Referral:   S43.432A (ICD-10-CM) - Tear of left glenoid labrum, initial encounter   M25.311,M25.312 (ICD-10-CM) - Instability of both shoulder joints      Evaluation Date: 4/22/2024  Authorization Period Expiration: 12/31/24  Plan of Care Expiration: 12/31/24  Progress Note Due: 5/22/24  Date of Surgery: 4/18/24  Visit # / Visits authorized: 1/ 20  FOTO: 1/ 3     Precautions: Standard   Procedure(s) (LRB):  ARTHROSCOPY, SHOULDER, WITH SLAP REPAIR- POLAR CARE (Left)  ARTHROSCOPY,SHOULDER,WITH CAPSULORRHAPHY (Left)  Post-Op Plan:  Posterior labral repair - Dr. Sy         Time In: 12:30pm  Time Out: 1:30pm  Total Billable Time: 60 minutes    Subjective     Patient reports: Shoulder is feeling good today, no pain. HEP is going well.  She was compliant with home exercise program.  Response to previous treatment: tolerated eval well  Functional change: ongoing    Pain: 1/10  Location: left shoulder      Objective    DOS: 4/18/24  POD: 6 days     Observation: Pt alert and oriented. Incision site areas look good, no signs of redness, edema or infection.     Posture: slight winging of scapulas, pt in shoulder sling - donned appropriately     Passive Range of Motion:   Shoulder Left   Flexion Not measured    Abduction Not measured due to post op protocol   ER at 20 20   IR Not measured due to postop protocol       Treatment     Kala received the treatments listed below:       therapeutic exercises to develop strength, endurance, and ROM for 23 minutes including:    Elbow flexion/ext AROM x20  Wrist flexion/ext AROM x15  Shoulder flex/ER/IR isometrics 5-10s hold 2x4    manual therapy techniques: Joint mobilizations and Soft tissue Mobilization were applied to the: L Shoulder for 14 minutes, including:    GH centering mobilization  PROM ER at 20 and flexion  Shoulder ROM assessment    neuromuscular re-education activities to improve: Balance, Coordination, and Proprioception for 23 minutes. The following activities were included:    Supine AAROM ER at 45 degrees 2x10  Scap Squeezes 3-5s hold x20  Table Slides x20      therapeutic activities to improve functional performance for 00  minutes, including:  NP Today    Patient Education and Home Exercises       Education provided:   - Plan of Care  - HEP  - Precautions, signs and symptoms of infection    Written Home Exercises Provided: Patient instructed to cont prior HEP. Exercises were reviewed and aKla was able to demonstrate them prior to the end of the session.  Kala demonstrated good  understanding of the education provided. See Electronic Medical Record under Patient Instructions for exercises provided during therapy sessions    Assessment     Kala is doing well, responded well to centering mobe today. Able to improve overall ROM with passive movements and AAROM. Elbow flexion/ext is improving, exhibits decreased strength observed with shaking of biceps during movement. Able to progress to isometrics today, tolerated better with lower reps and rest breaks. Will continue to progress as tolerated.    Kala Is progressing well towards her goals.   Patient prognosis is Good.     Patient will continue to benefit from skilled outpatient physical therapy to address the deficits listed in the problem list box on initial evaluation, provide pt/family education and to maximize pt's level of independence in the home and community  environment.     Patient's spiritual, cultural and educational needs considered and pt agreeable to plan of care and goals.     Anticipated barriers to physical therapy: none    Goals:   Short Term Goals: 12 weeks  1.Report decreased L shoulder pain < / =  3-4/10 at worst to increase tolerance for functional activities and ADLs.   2. Increase PROM 90% of unaffected side or greater  3. Increased strength by 1/3 MMT grade in areas of deficit to increase tolerance for ADL and work activities.  4. Pt to tolerate HEP to improve ROM and independence with ADL's     Long Term Goals: 24 weeks  1.Report decreased L shoulder pain  < / =  1-2 /10 at worst  to increase tolerance for functional activities and ADLs.  2.Increase AROM to within functional limits in order to optimize performance for functional activities.  3.Increase strength to >/= 4/5 in areas of limitations to increase tolerance for ADL and work activities.  4. Pt goal: To be able to walk dogs and get shoulder back to normal  5. Pt will have improved gcode of CJ (20-40% limited) on FOTO shoulder in order to demonstrate true functional improvement.    Plan     Plan of care Certification: 4/22/2024 to 12/31/2024.     Outpatient Physical Therapy 1-2 times weekly for 21-32 weeks to include the following interventions: Manual Therapy, Neuromuscular Re-ed, Patient Education, Therapeutic Activities, and Therapeutic Exercise.      Jose Mehta PT  Co treated by Joy MCDONNELL   I certify that I was present in the room directing the student in service delivery and guiding them using my skilled judgment.  As the co-signing therapist I have reviewed the students documentation and am responsible for the treatment, assessment, and plan    KECIA Christy

## 2024-04-29 ENCOUNTER — CLINICAL SUPPORT (OUTPATIENT)
Dept: REHABILITATION | Facility: HOSPITAL | Age: 34
End: 2024-04-29
Payer: COMMERCIAL

## 2024-04-29 DIAGNOSIS — M25.512 ACUTE PAIN OF LEFT SHOULDER: Primary | ICD-10-CM

## 2024-04-29 PROCEDURE — 97140 MANUAL THERAPY 1/> REGIONS: CPT

## 2024-04-29 PROCEDURE — 97110 THERAPEUTIC EXERCISES: CPT

## 2024-04-29 PROCEDURE — 97112 NEUROMUSCULAR REEDUCATION: CPT

## 2024-04-29 NOTE — PROGRESS NOTES
[facility]   Physical Therapy Treatment Note      Name: Kala Raya  [facility] Number: [id number]    Therapy Diagnosis:   Therapy Diagnosis:        Encounter Diagnoses   Name Primary?    Tear of left glenoid labrum, initial encounter      Instability of both shoulder joints      Shoulder weakness Yes    Chronic pain of both shoulders      Acute pain of left shoulder      Physician: Andrea Maciel PA-C    Visit Date: 4/29/2024    Physician: Andrea Maciel PA-C     Physician Orders: PT Eval and Treat   Medical Diagnosis from Referral:   S43.432A (ICD-10-CM) - Tear of left glenoid labrum, initial encounter   M25.311,M25.312 (ICD-10-CM) - Instability of both shoulder joints      Evaluation Date: 4/22/2024  Authorization Period Expiration: 12/31/24  Plan of Care Expiration: 12/31/24  Progress Note Due: 5/22/24  Date of Surgery: 4/18/24  Visit # / Visits authorized: 2/ 20  FOTO: 1/ 3     Precautions: Standard   Procedure(s) (LRB):  ARTHROSCOPY, SHOULDER, WITH SLAP REPAIR- POLAR CARE (Left)  ARTHROSCOPY,SHOULDER,WITH CAPSULORRHAPHY (Left)  Post-Op Plan:  Posterior labral repair - Dr. Sy         Time In: 12:30pm  Time Out: 1:25pm  Total Billable Time: 55 minutes    Subjective     Patient reports: Shoulder feels good today, minimal to no pain. HEP is going well, felt good after added exercises last time.  She was compliant with home exercise program.  Response to previous treatment: tolerated last session well  Functional change: ongoing    Pain: 1/10  Location: left shoulder      Objective    DOS: 4/18/24  POD: 1 wk, 4 d     Observation: Pt alert and oriented. Incision site areas look good, no signs of redness, edema or infection.     Posture: slight winging of scapulas, pt in shoulder sling - donned appropriately     Passive Range of Motion:   Shoulder Left   Flexion 90   Abduction Not measured due to post op protocol   ER at 20 40   IR Not measured due to postop protocol       Treatment      Kala received the treatments listed below:      therapeutic exercises to develop strength, endurance, and ROM for 18 minutes including:    Elbow flexion/ext AROM x20  Shoulder flex/ER/IR isometrics 5-10s hold 1x10  Pt education on HEP, precautions, posture    NP:  Wrist flexion/ext AROM x15    manual therapy techniques: Joint mobilizations and Soft tissue Mobilization were applied to the: L Shoulder for 18 minutes, including:    GH centering mobilization  PROM ER at 20 and flexion  Shoulder ROM assessment    neuromuscular re-education activities to improve: Balance, Coordination, and Proprioception for 18 minutes. The following activities were included:    Supine AAROM ER at 45 degrees 2x10  Scap Squeezes 3-5s hold x20  Table Slides x20  Table walkbacks x10      therapeutic activities to improve functional performance for 00  minutes, including:  NP Today    Patient Education and Home Exercises       Education provided:   - Plan of Care  - HEP  - Precautions, signs and symptoms of infection    Written Home Exercises Provided: Patient instructed to cont prior HEP. Exercises were reviewed and Kala was able to demonstrate them prior to the end of the session.  Kala demonstrated good  understanding of the education provided. See Electronic Medical Record under Patient Instructions for exercises provided during therapy sessions    Assessment     Kala is doing well, presenting with minimal to no pain. Kala demos good carryover of ROM from previous sessions and demos improvement of strength with her elbow flexion AROM and wall isometrics with less shaking of muscles. Educated on posture for work as she was having some low back pain. She tolerated table walk backs today well. Educated on ROM precautions. Will continue to progress as tolerated.     Kala Is progressing well towards her goals.   Patient prognosis is Good.     Patient will continue to benefit from skilled outpatient physical therapy to address the  deficits listed in the problem list box on initial evaluation, provide pt/family education and to maximize pt's level of independence in the home and community environment.     Patient's spiritual, cultural and educational needs considered and pt agreeable to plan of care and goals.     Anticipated barriers to physical therapy: none    Goals:   Short Term Goals: 12 weeks  1.Report decreased L shoulder pain < / =  3-4/10 at worst to increase tolerance for functional activities and ADLs.   2. Increase PROM 90% of unaffected side or greater  3. Increased strength by 1/3 MMT grade in areas of deficit to increase tolerance for ADL and work activities.  4. Pt to tolerate HEP to improve ROM and independence with ADL's     Long Term Goals: 24 weeks  1.Report decreased L shoulder pain  < / =  1-2 /10 at worst  to increase tolerance for functional activities and ADLs.  2.Increase AROM to within functional limits in order to optimize performance for functional activities.  3.Increase strength to >/= 4/5 in areas of limitations to increase tolerance for ADL and work activities.  4. Pt goal: To be able to walk dogs and get shoulder back to normal  5. Pt will have improved gcode of CJ (20-40% limited) on FOTO shoulder in order to demonstrate true functional improvement.    Plan     Plan of care Certification: 4/22/2024 to 12/31/2024.     Outpatient Physical Therapy 1-2 times weekly for 21-32 weeks to include the following interventions: Manual Therapy, Neuromuscular Re-ed, Patient Education, Therapeutic Activities, and Therapeutic Exercise.      Jose Mehta, PT  Co treated by Joy MCDONNELL   I certify that I was present in the room directing the student in service delivery and guiding them using my skilled judgment.  As the co-signing therapist I have reviewed the students documentation and am responsible for the treatment, assessment, and plan    KECIA Christy

## 2024-05-02 NOTE — PROGRESS NOTES
POST-OPERATIVE EXAMINATION    33 y.o. Female who returns for follow-up after surgery. She is 2 weeks s/p    PROCEDURE:   Left shoulder Arthroscopic posterior labral repair with capsulorrhaphy      She is doing well without any issues.     PHYSICAL EXAMINATION:  LMP 03/18/2024 (Exact Date)   General: Well-developed well-nourished 33 y.o. femalein no acute distress   Cardiovascular: Regular rhythm   Lungs: No labored breathing or wheezing appreciated   Neuro: Alert and oriented ×3   Psychiatric: well oriented to person, place and time, demonstrates normal mood and affect   Skin: No rashes, lesions or ulcers, normal temperature, turgor, and texture on involved extremity    ORTHOPEDIC EXAM:  Normal post-operative swelling  Normal post-operative scarring  Strength: Grossly intact  ROM:  As expected      ASSESSMENT:      ICD-10-CM ICD-9-CM   1. S/P arthroscopy of left shoulder  Z98.890 V45.89         PLAN:       Sutures removed today  Continue physical therapy  RTC in 1 month with Andrea Maciel PA-C

## 2024-05-02 NOTE — PROGRESS NOTES
[facility] OUTPAT[facility] THERAPY AND [facility]LL[facility]   Physical Therapy Treatment Note      Name: Kala Raya  Clinic Number: [id number]    Therapy Diagnosis:        Encounter Diagnoses   Name Primary?    Tear of left glenoid labrum, initial encounter      Instability of both shoulder joints      Shoulder weakness Yes    Chronic pain of both shoulders      Acute pain of left shoulder      Physician: Andrea Maciel PA-C    Visit Date: 5/3/2024     Physician Orders: PT Eval and Treat   Medical Diagnosis from Referral:   S43.432A (ICD-10-CM) - Tear of left glenoid labrum, initial encounter   M25.311,M25.312 (ICD-10-CM) - Instability of both shoulder joints   Evaluation Date: 4/22/2024  Authorization Period Expiration: 12/31/24  Plan of Care Expiration: 12/31/24  Progress Note Due: 5/22/24  Date of Surgery: 4/18/24  Visit # / Visits authorized: 3/20  FOTO: 1/3     Time In: 1205  Time Out: 1305  Total Billable Time: 48 minutes    Precautions: Standard     Procedure by [employer]: 4/18/24  Left shoulder Arthroscopic posterior labral repair with capsulorrhaphy   4 anchors 1:00-4:00    Post-Op Plan: Posterior labral repair      Weeks 0-4:        Subjective     Pt reports: her shoulder feels good today. Reports compliance with HEP 3x/daily. Presents in sling.      She was compliant with home exercise program.  Response to previous treatment: tolerated last session well  Functional change: ongoing    Pain: 1/10  Location: left shoulder      Objective      DOS: 4/18/24  POD: 2 weeks, 1 day as of 5/3     Observation: Pt alert and oriented. Incision site areas look good, no signs of redness, edema or infection.     Posture: slight winging of scapulas, pt in shoulder sling - donned appropriately     Passive Range of Motion:   Shoulder Left   Flexion 90   Abduction Not measured due to post op protocol   ER at 45 45   IR Not measured due to postop protocol     Treatment     Kala received the treatments listed below:      therapeutic exercises  "to develop strength, endurance, and ROM for 23 minutes including:    Pendulum hang 3 x 30"   Flexion walkaway stretch 10 x 10"  Elbow flexion/ext AROM x 30  Wrist ext, RD, flexion x 30 each  Shoulder flex/ER/IR isometrics 5-10s hold 1x10 - np  Pt education: HEP exercises/frequency, avoiding excessive AA/PROM during acute phase    manual therapy techniques: Joint mobilizations and Soft tissue Mobilization were applied to the: L Shoulder for 15 minutes, including:    Assessment of shldr ROM  Gentle oscillations for pain/guarding (gr I)  GH centering mobilization  PROM flexion and ER within protocol limitations  Supine rhythmic stab @ 30deg (elbow flx/ext, GH ER)    neuromuscular re-education activities to improve: Balance, Coordination, and Proprioception for 10 minutes. The following activities were included:    Shrugs x 30  Scap retraction x 30  Retro rolls x 30    therapeutic activities to improve functional performance for 00 minutes, including:        Patient Education and Home Exercises       Education provided:   - Plan of Care  - HEP  - Precautions, signs and symptoms of infection    Written Home Exercises Provided: Patient instructed to cont prior HEP. Exercises were reviewed and Kala was able to demonstrate them prior to the end of the session.  Kala demonstrated good  understanding of the education provided. See Electronic Medical Record under Patient Instructions for exercises provided during therapy sessions    Assessment     Kala is progressing well at this time. Good tolerance to initiation of gentle ER and elbow flex/ext rhythmic stab. She was able to meet her ROM milestones with ease and could likely benefit from less daily AA/PROM. Will assess ROM next tx and continue as appropriate. No adverse effects reported p tx.     Kala is progressing well towards her goals.   Patient prognosis is Good.     Patient will continue to benefit from skilled outpatient physical therapy to address the deficits " listed in the problem list box on initial evaluation, provide pt/family education and to maximize pt's level of independence in the home and community environment.     Patient's spiritual, cultural and educational needs considered and pt agreeable to plan of care and goals.     Anticipated barriers to physical therapy: none    Goals:   Short Term Goals: 12 weeks  1.Report decreased L shoulder pain < / =  3-4/10 at worst to increase tolerance for functional activities and ADLs.   2. Increase PROM 90% of unaffected side or greater  3. Increased strength by 1/3 MMT grade in areas of deficit to increase tolerance for ADL and work activities.  4. Pt to tolerate HEP to improve ROM and independence with ADL's     Long Term Goals: 24 weeks  1.Report decreased L shoulder pain  < / =  1-2 /10 at worst  to increase tolerance for functional activities and ADLs.  2.Increase AROM to within functional limits in order to optimize performance for functional activities.  3.Increase strength to >/= 4/5 in areas of limitations to increase tolerance for ADL and work activities.  4. Pt goal: To be able to walk dogs and get shoulder back to normal  5. Pt will have improved gcode of CJ (20-40% limited) on FOTO shoulder in order to demonstrate true functional improvement.    Plan   Plan of care Certification: 4/22/2024 to 12/31/2024.     Outpatient Physical Therapy 1-2 times weekly for 21-32 weeks to include the following interventions: Manual Therapy, Neuromuscular Re-ed, Patient Education, Therapeutic Activities, and Therapeutic Exercise.     Martina Wilson, PTA

## 2024-05-03 ENCOUNTER — OFFICE VISIT (OUTPATIENT)
Dept: SPORTS MEDICINE | Facility: CLINIC | Age: 34
End: 2024-05-03
Payer: COMMERCIAL

## 2024-05-03 ENCOUNTER — CLINICAL SUPPORT (OUTPATIENT)
Dept: REHABILITATION | Facility: HOSPITAL | Age: 34
End: 2024-05-03
Payer: COMMERCIAL

## 2024-05-03 VITALS
BODY MASS INDEX: 21.48 KG/M2 | HEART RATE: 76 BPM | HEIGHT: 67 IN | WEIGHT: 136.88 LBS | DIASTOLIC BLOOD PRESSURE: 74 MMHG | SYSTOLIC BLOOD PRESSURE: 112 MMHG

## 2024-05-03 DIAGNOSIS — R29.898 SHOULDER WEAKNESS: ICD-10-CM

## 2024-05-03 DIAGNOSIS — Z98.890 S/P ARTHROSCOPY OF LEFT SHOULDER: Primary | ICD-10-CM

## 2024-05-03 DIAGNOSIS — M25.512 ACUTE PAIN OF LEFT SHOULDER: Primary | ICD-10-CM

## 2024-05-03 PROCEDURE — 99024 POSTOP FOLLOW-UP VISIT: CPT | Mod: S$GLB,,, | Performed by: ORTHOPAEDIC SURGERY

## 2024-05-03 PROCEDURE — 97110 THERAPEUTIC EXERCISES: CPT | Mod: CQ

## 2024-05-03 PROCEDURE — 3078F DIAST BP <80 MM HG: CPT | Mod: CPTII,S$GLB,, | Performed by: ORTHOPAEDIC SURGERY

## 2024-05-03 PROCEDURE — 1159F MED LIST DOCD IN RCRD: CPT | Mod: CPTII,S$GLB,, | Performed by: ORTHOPAEDIC SURGERY

## 2024-05-03 PROCEDURE — 3074F SYST BP LT 130 MM HG: CPT | Mod: CPTII,S$GLB,, | Performed by: ORTHOPAEDIC SURGERY

## 2024-05-03 PROCEDURE — 97140 MANUAL THERAPY 1/> REGIONS: CPT | Mod: CQ

## 2024-05-03 PROCEDURE — 99999 PR PBB SHADOW E&M-EST. PATIENT-LVL III: CPT | Mod: PBBFAC,,, | Performed by: ORTHOPAEDIC SURGERY

## 2024-05-03 PROCEDURE — 97112 NEUROMUSCULAR REEDUCATION: CPT | Mod: CQ

## 2024-05-08 ENCOUNTER — CLINICAL SUPPORT (OUTPATIENT)
Dept: REHABILITATION | Facility: HOSPITAL | Age: 34
End: 2024-05-08
Payer: COMMERCIAL

## 2024-05-08 DIAGNOSIS — R29.898 SHOULDER WEAKNESS: Primary | ICD-10-CM

## 2024-05-08 DIAGNOSIS — M25.512 ACUTE PAIN OF LEFT SHOULDER: ICD-10-CM

## 2024-05-08 PROCEDURE — 97112 NEUROMUSCULAR REEDUCATION: CPT

## 2024-05-08 PROCEDURE — 97140 MANUAL THERAPY 1/> REGIONS: CPT

## 2024-05-08 PROCEDURE — 97110 THERAPEUTIC EXERCISES: CPT

## 2024-05-08 NOTE — PROGRESS NOTES
[facility]   Physical Therapy Treatment Note      Name: Kala Raya  Clinic Number: [id number]    Therapy Diagnosis:        Encounter Diagnoses   Name Primary?    Tear of left glenoid labrum, initial encounter      Instability of both shoulder joints      Shoulder weakness Yes    Chronic pain of both shoulders      Acute pain of left shoulder      Physician: Andrea Maciel PA-C    Visit Date: 5/8/2024     Physician Orders: PT Eval and Treat   Medical Diagnosis from Referral:   S43.432A (ICD-10-CM) - Tear of left glenoid labrum, initial encounter   M25.311,M25.312 (ICD-10-CM) - Instability of both shoulder joints   Evaluation Date: 4/22/2024  Authorization Period Expiration: 12/31/24  Plan of Care Expiration: 12/31/24  Progress Note Due: 5/22/24  Date of Surgery: 4/18/24  Visit # / Visits authorized: 4/20  FOTO: 1/3     Time In: 11 am  Time Out: 12:01 pm  Total Billable Time: 61 minutes    Precautions: Standard     Procedure by [employer]: 4/18/24  Left shoulder Arthroscopic posterior labral repair with capsulorrhaphy   4 anchors 1:00-4:00    Post-Op Plan: Posterior labral repair      Weeks 0-4:        Subjective     Pt reports: she reached out with her unaffected arm and felt sharp pain in surgery arm. The pain dissipated in 20 mins but was still sore for a while.    She was compliant with home exercise program.  Response to previous treatment: tolerated last session well  Functional change: ongoing    Pain: 2/10  Location: left shoulder      Objective      DOS: 4/18/24  POD: 2 weeks, 6 day as of 5/8     Observation: Pt alert and oriented. Incision site areas look good, no signs of redness, edema or infection.     Posture: slight winging of scapulas, pt in shoulder sling - donned appropriately     Passive Range of Motion:   Shoulder Left   Flexion 90   Abduction Not measured due to post op protocol   ER at 45 45   IR Not measured due to postop protocol     Treatment     Kala received the  "treatments listed below:      therapeutic exercises to develop strength, endurance, and ROM for 23 minutes including:    Pendulum hang 3 x 30"   Flexion walkaway stretch 10 x 10" -NP  Elbow flexion/ext AROM x 30  Wrist ext, RD, flexion x 30 each 3#   Pt education: HEP exercises/frequency, avoiding excessive AA/PROM during acute phase    manual therapy techniques: Joint mobilizations and Soft tissue Mobilization were applied to the: L Shoulder for 15 minutes, including:    Assessment of shldr ROM  Gentle oscillations for pain/guarding (gr I)  GH centering mobilization  PROM flexion and ER within protocol limitations    neuromuscular re-education activities to improve: Balance, Coordination, and Proprioception for 23 minutes. The following activities were included:    Prone Scap retraction x 30  Shoulder flex/ER/IR isometrics 5-10s hold 1x10   Supine rhythmic stab @ 80 deg (elbow flx/ext, GH ER)    therapeutic activities to improve functional performance for 00 minutes, including:        Patient Education and Home Exercises       Education provided:   - Plan of Care  - HEP  - Precautions, signs and symptoms of infection    Written Home Exercises Provided: Patient instructed to cont prior HEP. Exercises were reviewed and Kala was able to demonstrate them prior to the end of the session.  Kala demonstrated good  understanding of the education provided. See Electronic Medical Record under Patient Instructions for exercises provided during therapy sessions    Assessment     Kala is progressing well at this time. Continued to emphasis not pushing ROM as she has achieved ROM per protocol and can achieve more. She is very weak and shaky with all rhythmic stab exercises. Overall doing well.     Kala is progressing well towards her goals.   Patient prognosis is Good.     Patient will continue to benefit from skilled outpatient physical therapy to address the deficits listed in the problem list box on initial evaluation, " provide pt/family education and to maximize pt's level of independence in the home and community environment.     Patient's spiritual, cultural and educational needs considered and pt agreeable to plan of care and goals.     Anticipated barriers to physical therapy: none    Goals:   Short Term Goals: 12 weeks  1.Report decreased L shoulder pain < / =  3-4/10 at worst to increase tolerance for functional activities and ADLs.   2. Increase PROM 90% of unaffected side or greater  3. Increased strength by 1/3 MMT grade in areas of deficit to increase tolerance for ADL and work activities.  4. Pt to tolerate HEP to improve ROM and independence with ADL's     Long Term Goals: 24 weeks  1.Report decreased L shoulder pain  < / =  1-2 /10 at worst  to increase tolerance for functional activities and ADLs.  2.Increase AROM to within functional limits in order to optimize performance for functional activities.  3.Increase strength to >/= 4/5 in areas of limitations to increase tolerance for ADL and work activities.  4. Pt goal: To be able to walk dogs and get shoulder back to normal  5. Pt will have improved gcode of CJ (20-40% limited) on FOTO shoulder in order to demonstrate true functional improvement.    Plan   Plan of care Certification: 4/22/2024 to 12/31/2024.     Outpatient Physical Therapy 1-2 times weekly for 21-32 weeks to include the following interventions: Manual Therapy, Neuromuscular Re-ed, Patient Education, Therapeutic Activities, and Therapeutic Exercise.     Jose Mehta, PT

## 2024-05-10 ENCOUNTER — CLINICAL SUPPORT (OUTPATIENT)
Dept: REHABILITATION | Facility: HOSPITAL | Age: 34
End: 2024-05-10
Payer: COMMERCIAL

## 2024-05-10 DIAGNOSIS — R29.898 SHOULDER WEAKNESS: Primary | ICD-10-CM

## 2024-05-10 DIAGNOSIS — M25.512 ACUTE PAIN OF LEFT SHOULDER: ICD-10-CM

## 2024-05-10 PROCEDURE — 97110 THERAPEUTIC EXERCISES: CPT

## 2024-05-10 PROCEDURE — 97112 NEUROMUSCULAR REEDUCATION: CPT

## 2024-05-10 PROCEDURE — 97140 MANUAL THERAPY 1/> REGIONS: CPT

## 2024-05-10 NOTE — PROGRESS NOTES
[facility]   Physical Therapy Treatment Note      Name: Kala Raya  Clinic Number: [id number]    Therapy Diagnosis:        Encounter Diagnoses   Name Primary?    Tear of left glenoid labrum, initial encounter      Instability of both shoulder joints      Shoulder weakness Yes    Chronic pain of both shoulders      Acute pain of left shoulder      Physician: Andrea Maciel PA-C    Visit Date: 5/10/2024     Physician Orders: PT Eval and Treat   Medical Diagnosis from Referral:   S43.432A (ICD-10-CM) - Tear of left glenoid labrum, initial encounter   M25.311,M25.312 (ICD-10-CM) - Instability of both shoulder joints   Evaluation Date: 4/22/2024  Authorization Period Expiration: 12/31/24  Plan of Care Expiration: 12/31/24  Progress Note Due: 5/22/24  Date of Surgery: 4/18/24  Visit # / Visits authorized: 5/20  FOTO: 1/3     Time In: 10 am  Time Out: 11:00 pm  Total Billable Time: 60 minutes    Precautions: Standard     Procedure by [employer]: 4/18/24  Left shoulder Arthroscopic posterior labral repair with capsulorrhaphy   4 anchors 1:00-4:00    Post-Op Plan: Posterior labral repair      Weeks 0-4:        Subjective     Pt reports: still with     She was compliant with home exercise program.  Response to previous treatment: tolerated last session well  Functional change: ongoing    Pain: 2/10  Location: left shoulder      Objective      DOS: 4/18/24  POD: 3 weeks, 1 day as of 5/10     Observation: Pt alert and oriented. Incision site areas look good, no signs of redness, edema or infection.     Posture: slight winging of scapulas, pt in shoulder sling - donned appropriately     Passive Range of Motion:   Shoulder Left   Flexion 90   Abduction Not measured due to post op protocol   ER at 45 45   IR Not measured due to postop protocol     Treatment     Kala received the treatments listed below:      therapeutic exercises to develop strength, endurance, and ROM for 15 minutes  "including:  Recumb bike 6 mins   Pendulum hang 3 x 30"   Elbow flexion/ext AROM x 30    manual therapy techniques: Joint mobilizations and Soft tissue Mobilization were applied to the: L Shoulder for 15 minutes, including:    Assessment of shldr ROM  Gentle oscillations for pain/guarding (gr I)  GH centering mobilization  PROM flexion and ER within protocol limitations    neuromuscular re-education activities to improve: Balance, Coordination, and Proprioception for 30 minutes. The following activities were included:    Prone Scap retraction x 30  AAROM - Chest press with dowel to 45 - 2x10  Supine rhythmic stab @ 30 deg - ER 4x30s  Ball on mat circles 2x10   Ball on mat with perturbations   Rows with unaffected UE with scap squeeze - 2x10    therapeutic activities to improve functional performance for 00 minutes, including:        Patient Education and Home Exercises       Education provided:   - Plan of Care  - HEP  - Precautions, signs and symptoms of infection    Written Home Exercises Provided: Patient instructed to cont prior HEP. Exercises were reviewed and Kala was able to demonstrate them prior to the end of the session.  Kala demonstrated good  understanding of the education provided. See Electronic Medical Record under Patient Instructions for exercises provided during therapy sessions    Assessment     Kala is progressing well at this time. Continued to emphasis not pushing ROM as she has achieved ROM per protocol and can achieve more. She is very weak and shaky with all rhythmic stab exercises. Overall doing well.     Kala is progressing well towards her goals.   Patient prognosis is Good.     Patient will continue to benefit from skilled outpatient physical therapy to address the deficits listed in the problem list box on initial evaluation, provide pt/family education and to maximize pt's level of independence in the home and community environment.     Patient's spiritual, cultural and educational " needs considered and pt agreeable to plan of care and goals.     Anticipated barriers to physical therapy: none    Goals:   Short Term Goals: 12 weeks  1.Report decreased L shoulder pain < / =  3-4/10 at worst to increase tolerance for functional activities and ADLs.   2. Increase PROM 90% of unaffected side or greater  3. Increased strength by 1/3 MMT grade in areas of deficit to increase tolerance for ADL and work activities.  4. Pt to tolerate HEP to improve ROM and independence with ADL's     Long Term Goals: 24 weeks  1.Report decreased L shoulder pain  < / =  1-2 /10 at worst  to increase tolerance for functional activities and ADLs.  2.Increase AROM to within functional limits in order to optimize performance for functional activities.  3.Increase strength to >/= 4/5 in areas of limitations to increase tolerance for ADL and work activities.  4. Pt goal: To be able to walk dogs and get shoulder back to normal  5. Pt will have improved gcode of CJ (20-40% limited) on FOTO shoulder in order to demonstrate true functional improvement.    Plan   Plan of care Certification: 4/22/2024 to 12/31/2024.     Outpatient Physical Therapy 1-2 times weekly for 21-32 weeks to include the following interventions: Manual Therapy, Neuromuscular Re-ed, Patient Education, Therapeutic Activities, and Therapeutic Exercise.     Jose Mehta, PT

## 2024-05-15 ENCOUNTER — CLINICAL SUPPORT (OUTPATIENT)
Dept: REHABILITATION | Facility: HOSPITAL | Age: 34
End: 2024-05-15
Payer: COMMERCIAL

## 2024-05-15 DIAGNOSIS — R29.898 SHOULDER WEAKNESS: Primary | ICD-10-CM

## 2024-05-15 DIAGNOSIS — M25.512 ACUTE PAIN OF LEFT SHOULDER: ICD-10-CM

## 2024-05-15 PROCEDURE — 97110 THERAPEUTIC EXERCISES: CPT

## 2024-05-15 PROCEDURE — 97112 NEUROMUSCULAR REEDUCATION: CPT

## 2024-05-15 PROCEDURE — 97140 MANUAL THERAPY 1/> REGIONS: CPT

## 2024-05-15 NOTE — PROGRESS NOTES
[facility]   Physical Therapy Treatment Note      Name: Kala Raya  Clinic Number: [id number]    Therapy Diagnosis:        Encounter Diagnoses   Name Primary?    Tear of left glenoid labrum, initial encounter      Instability of both shoulder joints      Shoulder weakness Yes    Chronic pain of both shoulders      Acute pain of left shoulder      Physician: Andrea Maciel PA-C    Visit Date: 5/15/2024     Physician Orders: PT Eval and Treat   Medical Diagnosis from Referral:   S43.432A (ICD-10-CM) - Tear of left glenoid labrum, initial encounter   M25.311,M25.312 (ICD-10-CM) - Instability of both shoulder joints   Evaluation Date: 4/22/2024  Authorization Period Expiration: 12/31/24  Plan of Care Expiration: 12/31/24  Progress Note Due: 5/22/24  Date of Surgery: 4/18/24  Visit # / Visits authorized: 6/20  FOTO: 1/3     Time In: 11 am  Time Out: 12:00 pm  Total Billable Time: 60 minutes    Precautions: Standard     Procedure by [employer]: 4/18/24  Left shoulder Arthroscopic posterior labral repair with capsulorrhaphy   4 anchors 1:00-4:00    Post-Op Plan: Posterior labral repair      Weeks 0-4:        Subjective     Pt reports: still with     She was compliant with home exercise program.  Response to previous treatment: tolerated last session well  Functional change: ongoing    Pain: 2/10  Location: left shoulder      Objective      DOS: 4/18/24  POD: 3 weeks, 6 day as of 5/10     Observation: Pt alert and oriented. Incision site areas look good, no signs of redness, edema or infection.     Posture: slight winging of scapulas, pt in shoulder sling - donned appropriately     Passive Range of Motion:   Shoulder Left   Flexion 90   Abduction Not measured due to post op protocol   ER at 45 45   IR Not measured due to postop protocol     Treatment     Kala received the treatments listed below:      therapeutic exercises to develop strength, endurance, and ROM for 15 minutes  "including:  Recumb bike 6 mins   Pendulum hang 3 x 30"   Elbow flexion/ext AROM x 30    manual therapy techniques: Joint mobilizations and Soft tissue Mobilization were applied to the: L Shoulder for 15 minutes, including:    Assessment of shldr ROM  Gentle oscillations for pain/guarding (gr I)  GH centering mobilization  PROM flexion and ER within protocol limitations    neuromuscular re-education activities to improve: Balance, Coordination, and Proprioception for 30 minutes. The following activities were included:    Prone Scap retraction x 30  AAROM - Chest press with dowel to 45 - 2x10  Supine rhythmic stab @ 30 deg - ER 4x30s  Ball on mat (UE at 45 degrees) circles 2x10   Ball on mat with perturbations   Rows with unaffected UE with scap squeeze - 2x10    therapeutic activities to improve functional performance for 00 minutes, including:        Patient Education and Home Exercises       Education provided:   - Plan of Care  - HEP  - Precautions, signs and symptoms of infection    Written Home Exercises Provided: Patient instructed to cont prior HEP. Exercises were reviewed and Kala was able to demonstrate them prior to the end of the session.  Kala demonstrated good  understanding of the education provided. See Electronic Medical Record under Patient Instructions for exercises provided during therapy sessions    Assessment     Kala is progressing well at this time. Continued to emphasis not pushing ROM as she has achieved ROM per protocol and can achieve more. She is very weak and shaky with all rhythmic stab exercises. Overall doing well.     Kala is progressing well towards her goals.   Patient prognosis is Good.     Patient will continue to benefit from skilled outpatient physical therapy to address the deficits listed in the problem list box on initial evaluation, provide pt/family education and to maximize pt's level of independence in the home and community environment.     Patient's spiritual, " cultural and educational needs considered and pt agreeable to plan of care and goals.     Anticipated barriers to physical therapy: none    Goals:   Short Term Goals: 12 weeks  1.Report decreased L shoulder pain < / =  3-4/10 at worst to increase tolerance for functional activities and ADLs.   2. Increase PROM 90% of unaffected side or greater  3. Increased strength by 1/3 MMT grade in areas of deficit to increase tolerance for ADL and work activities.  4. Pt to tolerate HEP to improve ROM and independence with ADL's     Long Term Goals: 24 weeks  1.Report decreased L shoulder pain  < / =  1-2 /10 at worst  to increase tolerance for functional activities and ADLs.  2.Increase AROM to within functional limits in order to optimize performance for functional activities.  3.Increase strength to >/= 4/5 in areas of limitations to increase tolerance for ADL and work activities.  4. Pt goal: To be able to walk dogs and get shoulder back to normal  5. Pt will have improved gcode of CJ (20-40% limited) on FOTO shoulder in order to demonstrate true functional improvement.    Plan   Plan of care Certification: 4/22/2024 to 12/31/2024.     Outpatient Physical Therapy 1-2 times weekly for 21-32 weeks to include the following interventions: Manual Therapy, Neuromuscular Re-ed, Patient Education, Therapeutic Activities, and Therapeutic Exercise.     Jose Mehta, PT

## 2024-05-20 ENCOUNTER — CLINICAL SUPPORT (OUTPATIENT)
Dept: REHABILITATION | Facility: HOSPITAL | Age: 34
End: 2024-05-20
Payer: COMMERCIAL

## 2024-05-20 DIAGNOSIS — M25.512 ACUTE PAIN OF LEFT SHOULDER: ICD-10-CM

## 2024-05-20 DIAGNOSIS — R29.898 SHOULDER WEAKNESS: Primary | ICD-10-CM

## 2024-05-20 PROCEDURE — 97140 MANUAL THERAPY 1/> REGIONS: CPT

## 2024-05-20 PROCEDURE — 97112 NEUROMUSCULAR REEDUCATION: CPT

## 2024-05-20 PROCEDURE — 97110 THERAPEUTIC EXERCISES: CPT

## 2024-05-20 NOTE — PROGRESS NOTES
[facility]   Physical Therapy Treatment Note      Name: Kala Raya  Clinic Number: [id number]    Therapy Diagnosis:        Encounter Diagnoses   Name Primary?    Tear of left glenoid labrum, initial encounter      Instability of both shoulder joints      Shoulder weakness Yes    Chronic pain of both shoulders      Acute pain of left shoulder      Physician: Andrea Maciel PA-C    Visit Date: 5/20/2024     Physician Orders: PT Eval and Treat   Medical Diagnosis from Referral:   S43.432A (ICD-10-CM) - Tear of left glenoid labrum, initial encounter   M25.311,M25.312 (ICD-10-CM) - Instability of both shoulder joints   Evaluation Date: 4/22/2024  Authorization Period Expiration: 12/31/24  Plan of Care Expiration: 12/31/24  Progress Note Due: 5/22/24  Date of Surgery: 4/18/24  Visit # / Visits authorized: 6/20  FOTO: 1/3     Time In: 11 am  Time Out: 12:00 pm  Total Billable Time: 60 minutes    Precautions: Standard     Procedure by [employer]: 4/18/24  Left shoulder Arthroscopic posterior labral repair with capsulorrhaphy   4 anchors 1:00-4:00    Post-Op Plan: Posterior labral repair      Weeks 0-4:            Subjective     Pt reports: still with     She was compliant with home exercise program.  Response to previous treatment: tolerated last session well  Functional change: ongoing    Pain: 2/10  Location: left shoulder      Objective      DOS: 4/18/24  POD: 3 weeks, 6 day as of 5/10     Observation: Pt alert and oriented. Incision site areas look good, no signs of redness, edema or infection.     Posture: slight winging of scapulas, pt in shoulder sling - donned appropriately     Passive Range of Motion:   Shoulder Left   Flexion 115   Abduction Not measured due to post op protocol   ER at 45 45   IR Not measured due to postop protocol     Treatment     Kala received the treatments listed below:      therapeutic exercises to develop strength, endurance, and ROM for 15 minutes  including:  Recumb bike 6 mins   Elbow flexion/ext AROM x 30    manual therapy techniques: Joint mobilizations and Soft tissue Mobilization were applied to the: L Shoulder for 17 minutes, including:    Assessment of shldr ROM  Gentle oscillations for pain/guarding (gr I)  GH centering mobilization  PROM flexion and ER within protocol limitations    neuromuscular re-education activities to improve: Balance, Coordination, and Proprioception for 28 minutes. The following activities were included:    Prone rows with retraction x 2x15  Prone ext 2x15  AAROM - shoulder flexion with hands clasped - 4x5  Supine rhythmic stab @ 30 deg - ER, 75 flexion 3x30s      therapeutic activities to improve functional performance for 04 minutes, including:  Pulleys - 4 mins flexion       Patient Education and Home Exercises       Education provided:   - Plan of Care  - HEP  - Precautions, signs and symptoms of infection    Written Home Exercises Provided: Patient instructed to cont prior HEP. Exercises were reviewed and Kala was able to demonstrate them prior to the end of the session.  Kala demonstrated good  understanding of the education provided. See Electronic Medical Record under Patient Instructions for exercises provided during therapy sessions    Assessment     Kala is progressing well at this time. Continued to emphasis not pushing ROM as she has achieved ROM per protocol and can achieve more.able to progress to 115 degrees today but still with pain and guarding. We are still using caution to avoid regaining motion too soon.     Kala is progressing well towards her goals.   Patient prognosis is Good.     Patient will continue to benefit from skilled outpatient physical therapy to address the deficits listed in the problem list box on initial evaluation, provide pt/family education and to maximize pt's level of independence in the home and community environment.     Patient's spiritual, cultural and educational needs  considered and pt agreeable to plan of care and goals.     Anticipated barriers to physical therapy: none    Goals:   Short Term Goals: 12 weeks  1.Report decreased L shoulder pain < / =  3-4/10 at worst to increase tolerance for functional activities and ADLs.   2. Increase PROM 90% of unaffected side or greater  3. Increased strength by 1/3 MMT grade in areas of deficit to increase tolerance for ADL and work activities.  4. Pt to tolerate HEP to improve ROM and independence with ADL's     Long Term Goals: 24 weeks  1.Report decreased L shoulder pain  < / =  1-2 /10 at worst  to increase tolerance for functional activities and ADLs.  2.Increase AROM to within functional limits in order to optimize performance for functional activities.  3.Increase strength to >/= 4/5 in areas of limitations to increase tolerance for ADL and work activities.  4. Pt goal: To be able to walk dogs and get shoulder back to normal  5. Pt will have improved gcode of CJ (20-40% limited) on FOTO shoulder in order to demonstrate true functional improvement.    Plan   Plan of care Certification: 4/22/2024 to 12/31/2024.     Outpatient Physical Therapy 1-2 times weekly for 21-32 weeks to include the following interventions: Manual Therapy, Neuromuscular Re-ed, Patient Education, Therapeutic Activities, and Therapeutic Exercise.     Jose Mehta, PT

## 2024-05-24 ENCOUNTER — CLINICAL SUPPORT (OUTPATIENT)
Dept: REHABILITATION | Facility: HOSPITAL | Age: 34
End: 2024-05-24
Payer: COMMERCIAL

## 2024-05-24 DIAGNOSIS — R29.898 SHOULDER WEAKNESS: Primary | ICD-10-CM

## 2024-05-24 DIAGNOSIS — M25.512 ACUTE PAIN OF LEFT SHOULDER: ICD-10-CM

## 2024-05-24 PROCEDURE — 97530 THERAPEUTIC ACTIVITIES: CPT

## 2024-05-24 PROCEDURE — 97140 MANUAL THERAPY 1/> REGIONS: CPT

## 2024-05-24 PROCEDURE — 97112 NEUROMUSCULAR REEDUCATION: CPT

## 2024-05-24 NOTE — PROGRESS NOTES
[facility]   Physical Therapy Treatment Note      Name: Kala Raya  Clinic Number: [id number]    Therapy Diagnosis:        Encounter Diagnoses   Name Primary?    Tear of left glenoid labrum, initial encounter      Instability of both shoulder joints      Shoulder weakness Yes    Chronic pain of both shoulders      Acute pain of left shoulder      Physician: Andrea Maciel PA-C    Visit Date: 5/24/2024     Physician Orders: PT Eval and Treat   Medical Diagnosis from Referral:   S43.432A (ICD-10-CM) - Tear of left glenoid labrum, initial encounter   M25.311,M25.312 (ICD-10-CM) - Instability of both shoulder joints   Evaluation Date: 4/22/2024  Authorization Period Expiration: 12/31/24  Plan of Care Expiration: 12/31/24  Progress Note Due: 5/22/24  Date of Surgery: 4/18/24  Visit # / Visits authorized: 8/20  FOTO: 1/3     Time In: 10 am  Time Out: 11:00 am  Total Billable Time: 60 minutes    Precautions: Standard     Procedure by [employer]: 4/18/24  Left shoulder Arthroscopic posterior labral repair with capsulorrhaphy   4 anchors 1:00-4:00    Post-Op Plan: Posterior labral repair      Weeks 0-4:            Subjective     Pt reports: soreness has subsided and she is doing well since being out of the sling    She was compliant with home exercise program.  Response to previous treatment: tolerated last session well  Functional change: ongoing    Pain: 2/10  Location: left shoulder      Objective      DOS: 4/18/24  POD: 5 weeks, 1 day as of 5/10     Observation: Pt alert and oriented. Incision site areas look good, no signs of redness, edema or infection.     Posture: slight winging of scapulas, pt in shoulder sling - donned appropriately     Passive Range of Motion:   Shoulder Left   Flexion 125   Abduction Not measured due to post op protocol   ER at 45 50   IR Not measured due to postop protocol     Treatment     Kala received the treatments listed below:      therapeutic exercises to  develop strength, endurance, and ROM for 00 minutes including:    manual therapy techniques: Joint mobilizations and Soft tissue Mobilization were applied to the: L Shoulder for 15 minutes, including:    Assessment of shldr ROM  Gentle oscillations for pain/guarding (gr I)  GH centering mobilization  PROM flexion and ER within protocol limitations    neuromuscular re-education activities to improve: Balance, Coordination, and Proprioception for 30 minutes. The following activities were included:  Supine chest press - 4x5  Supine rhythmic stab @ 30 deg - ER, 90 flexion 3x30s  Ball on inclined mat at 45 degrees ABCs x2  ER isotonic YTB 2x10   ER walkouts YTB x10      therapeutic activities to improve functional performance for 15 minutes, including:  Pulleys - 5 mins flexion   Wall slides - 4x7      Patient Education and Home Exercises       Education provided:   - Plan of Care  - HEP  - Precautions, signs and symptoms of infection    Written Home Exercises Provided: Patient instructed to cont prior HEP. Exercises were reviewed and Kala was able to demonstrate them prior to the end of the session.  Kala demonstrated good  understanding of the education provided. See Electronic Medical Record under Patient Instructions for exercises provided during therapy sessions    Assessment     Kala is progressing well at this time. Continued to emphasis not pushing ROM as she has achieved ROM per protocol and can achieve more.able to progress to 125 degrees and with less pain and guarding. We are still using caution to avoid regaining motion too soon.     Kala is progressing well towards her goals.   Patient prognosis is Good.     Patient will continue to benefit from skilled outpatient physical therapy to address the deficits listed in the problem list box on initial evaluation, provide pt/family education and to maximize pt's level of independence in the home and community environment.     Patient's spiritual, cultural and  educational needs considered and pt agreeable to plan of care and goals.     Anticipated barriers to physical therapy: none    Goals:   Short Term Goals: 12 weeks  1.Report decreased L shoulder pain < / =  3-4/10 at worst to increase tolerance for functional activities and ADLs.   2. Increase PROM 90% of unaffected side or greater  3. Increased strength by 1/3 MMT grade in areas of deficit to increase tolerance for ADL and work activities.  4. Pt to tolerate HEP to improve ROM and independence with ADL's     Long Term Goals: 24 weeks  1.Report decreased L shoulder pain  < / =  1-2 /10 at worst  to increase tolerance for functional activities and ADLs.  2.Increase AROM to within functional limits in order to optimize performance for functional activities.  3.Increase strength to >/= 4/5 in areas of limitations to increase tolerance for ADL and work activities.  4. Pt goal: To be able to walk dogs and get shoulder back to normal  5. Pt will have improved gcode of CJ (20-40% limited) on FOTO shoulder in order to demonstrate true functional improvement.    Plan   Plan of care Certification: 4/22/2024 to 12/31/2024.     Outpatient Physical Therapy 1-2 times weekly for 21-32 weeks to include the following interventions: Manual Therapy, Neuromuscular Re-ed, Patient Education, Therapeutic Activities, and Therapeutic Exercise.     Jose Mehta, PT

## 2024-05-27 ENCOUNTER — CLINICAL SUPPORT (OUTPATIENT)
Dept: REHABILITATION | Facility: HOSPITAL | Age: 34
End: 2024-05-27
Payer: COMMERCIAL

## 2024-05-27 DIAGNOSIS — R29.898 SHOULDER WEAKNESS: Primary | ICD-10-CM

## 2024-05-27 DIAGNOSIS — M25.512 ACUTE PAIN OF LEFT SHOULDER: ICD-10-CM

## 2024-05-27 PROCEDURE — 97530 THERAPEUTIC ACTIVITIES: CPT

## 2024-05-27 PROCEDURE — 97112 NEUROMUSCULAR REEDUCATION: CPT

## 2024-05-27 PROCEDURE — 97140 MANUAL THERAPY 1/> REGIONS: CPT

## 2024-05-27 NOTE — PROGRESS NOTES
[facility]   Physical Therapy Treatment Note      Name: Kala Raya  Clinic Number: [id number]    Therapy Diagnosis:        Encounter Diagnoses   Name Primary?    Tear of left glenoid labrum, initial encounter      Instability of both shoulder joints      Shoulder weakness Yes    Chronic pain of both shoulders      Acute pain of left shoulder      Physician: Andrea Maciel PA-C    Visit Date: 5/27/2024     Physician Orders: PT Eval and Treat   Medical Diagnosis from Referral:   S43.432A (ICD-10-CM) - Tear of left glenoid labrum, initial encounter   M25.311,M25.312 (ICD-10-CM) - Instability of both shoulder joints   Evaluation Date: 4/22/2024  Authorization Period Expiration: 12/31/24  Plan of Care Expiration: 12/31/24  Progress Note Due: 5/22/24  Date of Surgery: 4/18/24  Visit # / Visits authorized: 9/20  FOTO: 1/3     Time In: 9:45 am  Time Out: 10:50 am  Total Billable Time: 65 minutes    Precautions: Standard     Procedure by [employer]: 4/18/24  Left shoulder Arthroscopic posterior labral repair with capsulorrhaphy   4 anchors 1:00-4:00    Post-Op Plan: Posterior labral repair      Weeks 0-4:            Subjective     Pt reports: soreness has subsided and she is doing well since being out of the sling    She was compliant with home exercise program.  Response to previous treatment: tolerated last session well  Functional change: ongoing    Pain: 2/10  Location: left shoulder      Objective      DOS: 4/18/24  POD: 5 weeks, 4 day as of 5/10     Observation: Pt alert and oriented. Incision site areas look good, no signs of redness, edema or infection.     Posture: slight winging of scapulas, pt in shoulder sling - donned appropriately     Passive Range of Motion:   Shoulder Left   Flexion 130   Abduction Not measured due to post op protocol   ER at 45 50   IR Not measured due to postop protocol     Treatment     Kala received the treatments listed below:      therapeutic exercises to  develop strength, endurance, and ROM for 00 minutes including:    manual therapy techniques: Joint mobilizations and Soft tissue Mobilization were applied to the: L Shoulder for 12 minutes, including:    Assessment of shldr ROM  Gentle oscillations for pain/guarding (gr I)  GH centering mobilization  PROM flexion and ER within protocol limitations    neuromuscular re-education activities to improve: Balance, Coordination, and Proprioception for 25 minutes. The following activities were included:  Supine chest press - 4x5  Supine rhythmic stab @ 30 deg - ER, 90 flexion 3x30s  Ball on inclined mat at 45 degrees ABCs x2  ER isotonic YTB 2x10   ER walkouts YTB x10  Prone row, prone ER - 2x10      therapeutic activities to improve functional performance for 23 minutes, including:  Pulleys - 5 mins flexion   Wall slides - 4x7  Landmine press - 3x8  Standing scaption AROM 4x5      Patient Education and Home Exercises       Education provided:   - Plan of Care  - HEP  - Precautions, signs and symptoms of infection    Written Home Exercises Provided: Patient instructed to cont prior HEP. Exercises were reviewed and Kala was able to demonstrate them prior to the end of the session.  Kala demonstrated good  understanding of the education provided. See Electronic Medical Record under Patient Instructions for exercises provided during therapy sessions    Assessment     Kala is progressing well at this time. Continued to emphasis not pushing ROM as she has achieved ROM per protocol and can achieve more.able to progress to 130 degrees and with less pain and guarding. We are still using caution to avoid regaining motion too soon.     Kala is progressing well towards her goals.   Patient prognosis is Good.     Patient will continue to benefit from skilled outpatient physical therapy to address the deficits listed in the problem list box on initial evaluation, provide pt/family education and to maximize pt's level of independence  in the home and community environment.     Patient's spiritual, cultural and educational needs considered and pt agreeable to plan of care and goals.     Anticipated barriers to physical therapy: none    Goals:   Short Term Goals: 12 weeks  1.Report decreased L shoulder pain < / =  3-4/10 at worst to increase tolerance for functional activities and ADLs.   2. Increase PROM 90% of unaffected side or greater  3. Increased strength by 1/3 MMT grade in areas of deficit to increase tolerance for ADL and work activities.  4. Pt to tolerate HEP to improve ROM and independence with ADL's     Long Term Goals: 24 weeks  1.Report decreased L shoulder pain  < / =  1-2 /10 at worst  to increase tolerance for functional activities and ADLs.  2.Increase AROM to within functional limits in order to optimize performance for functional activities.  3.Increase strength to >/= 4/5 in areas of limitations to increase tolerance for ADL and work activities.  4. Pt goal: To be able to walk dogs and get shoulder back to normal  5. Pt will have improved gcode of CJ (20-40% limited) on FOTO shoulder in order to demonstrate true functional improvement.    Plan   Plan of care Certification: 4/22/2024 to 12/31/2024.     Outpatient Physical Therapy 1-2 times weekly for 21-32 weeks to include the following interventions: Manual Therapy, Neuromuscular Re-ed, Patient Education, Therapeutic Activities, and Therapeutic Exercise.     Jose Mehta, PT

## 2024-05-31 ENCOUNTER — OFFICE VISIT (OUTPATIENT)
Dept: SPORTS MEDICINE | Facility: CLINIC | Age: 34
End: 2024-05-31
Payer: COMMERCIAL

## 2024-05-31 VITALS
WEIGHT: 134 LBS | HEIGHT: 67 IN | DIASTOLIC BLOOD PRESSURE: 82 MMHG | BODY MASS INDEX: 21.03 KG/M2 | HEART RATE: 81 BPM | SYSTOLIC BLOOD PRESSURE: 118 MMHG

## 2024-05-31 DIAGNOSIS — Z98.890 S/P ARTHROSCOPY OF LEFT SHOULDER: Primary | ICD-10-CM

## 2024-05-31 DIAGNOSIS — Z98.890 STATUS POST REPAIR OF GLENOID LABRUM: ICD-10-CM

## 2024-05-31 PROCEDURE — 99999 PR PBB SHADOW E&M-EST. PATIENT-LVL III: CPT | Mod: PBBFAC,,, | Performed by: PHYSICIAN ASSISTANT

## 2024-05-31 PROCEDURE — 3074F SYST BP LT 130 MM HG: CPT | Mod: CPTII,S$GLB,, | Performed by: PHYSICIAN ASSISTANT

## 2024-05-31 PROCEDURE — 1160F RVW MEDS BY RX/DR IN RCRD: CPT | Mod: CPTII,S$GLB,, | Performed by: PHYSICIAN ASSISTANT

## 2024-05-31 PROCEDURE — 3079F DIAST BP 80-89 MM HG: CPT | Mod: CPTII,S$GLB,, | Performed by: PHYSICIAN ASSISTANT

## 2024-05-31 PROCEDURE — 99024 POSTOP FOLLOW-UP VISIT: CPT | Mod: S$GLB,,, | Performed by: PHYSICIAN ASSISTANT

## 2024-05-31 PROCEDURE — 1159F MED LIST DOCD IN RCRD: CPT | Mod: CPTII,S$GLB,, | Performed by: PHYSICIAN ASSISTANT

## 2024-05-31 NOTE — PROGRESS NOTES
"POST-OPERATIVE EXAMINATION    33 y.o. Female who returns for follow-up after surgery. She is 6 weeks s/p    PROCEDURE:   Left shoulder Arthroscopic posterior labral repair with capsulorrhaphy      She is doing well without any issues.     PHYSICAL EXAMINATION:  /82   Pulse 81   Ht 5' 7" (1.702 m)   Wt 60.8 kg (134 lb)   BMI 20.99 kg/m²   General: Well-developed well-nourished 33 y.o. female in no acute distress   Cardiovascular: Regular rhythm   Lungs: No labored breathing or wheezing appreciated   Neuro: Alert and oriented ×3   Psychiatric: well oriented to person, place and time, demonstrates normal mood and affect   Skin: No rashes, lesions or ulcers, normal temperature, turgor, and texture on involved extremity    ORTHOPEDIC EXAM:  Normal post-operative swelling  Normal post-operative scarring  Strength: Grossly intact  ROM:  As expected      ASSESSMENT:      ICD-10-CM ICD-9-CM   1. S/P arthroscopy of left shoulder  Z98.890 V45.89   2. Status post repair of glenoid labrum  Z98.890 V45.89           PLAN:         Continue physical therapy then transition to HEP  She will be changing jobs and leaving the state, so she will lose her health insurance at the end of the month.  She will follow-up with us through a phone call in 2 months.  Overall, she is very pleased with her progress and response to surgery.              "

## 2024-06-05 ENCOUNTER — CLINICAL SUPPORT (OUTPATIENT)
Dept: REHABILITATION | Facility: HOSPITAL | Age: 34
End: 2024-06-05
Payer: COMMERCIAL

## 2024-06-05 DIAGNOSIS — R29.898 SHOULDER WEAKNESS: Primary | ICD-10-CM

## 2024-06-05 DIAGNOSIS — M25.512 ACUTE PAIN OF LEFT SHOULDER: ICD-10-CM

## 2024-06-05 PROCEDURE — 97140 MANUAL THERAPY 1/> REGIONS: CPT

## 2024-06-05 PROCEDURE — 97112 NEUROMUSCULAR REEDUCATION: CPT

## 2024-06-05 PROCEDURE — 97110 THERAPEUTIC EXERCISES: CPT

## 2024-06-05 NOTE — PROGRESS NOTES
[facility]   Physical Therapy Treatment Note      Name: Kala Raya  Clinic Number: [id number]    Therapy Diagnosis:        Encounter Diagnoses   Name Primary?    Tear of left glenoid labrum, initial encounter      Instability of both shoulder joints      Shoulder weakness Yes    Chronic pain of both shoulders      Acute pain of left shoulder      Physician: Andrea Maciel PA-C    Visit Date: 6/5/2024     Physician Orders: PT Eval and Treat   Medical Diagnosis from Referral:   S43.432A (ICD-10-CM) - Tear of left glenoid labrum, initial encounter   M25.311,M25.312 (ICD-10-CM) - Instability of both shoulder joints   Evaluation Date: 4/22/2024  Authorization Period Expiration: 12/31/24  Plan of Care Expiration: 12/31/24  Progress Note Due: 5/22/24  Date of Surgery: 4/18/24  Visit # / Visits authorized: 9/20  FOTO: 1/3     Time In: 9:45 am  Time Out: 10:50 am  Total Billable Time: 65 minutes    Precautions: Standard     Procedure by [employer]: 4/18/24  Left shoulder Arthroscopic posterior labral repair with capsulorrhaphy   4 anchors 1:00-4:00    Post-Op Plan: Posterior labral repair      Weeks 0-4:          Subjective     Pt reports: soreness has subsided and she is doing well since being out of the sling    She was compliant with home exercise program.  Response to previous treatment: tolerated last session well  Functional change: ongoing    Pain: 2/10  Location: left shoulder      Objective      DOS: 4/18/24  POD: 6 weeks, 6 day     Observation: Pt alert and oriented. Incision site areas look good, no signs of redness, edema or infection.     Posture: slight winging of scapulas, pt in shoulder sling - donned appropriately     Passive Range of Motion:   Shoulder Left   Flexion 130   Abduction Not measured due to post op protocol   ER at 45 50   IR Not measured due to postop protocol     Treatment     Kala received the treatments listed below:      therapeutic exercises to develop  strength, endurance, and ROM for 15 minutes including:  Bicep curls pron and sup - 5# 2x10 each   Supine chest press with OH lift - 3x10    manual therapy techniques: Joint mobilizations and Soft tissue Mobilization were applied to the: L Shoulder for 10 minutes, including:    Assessment of shldr ROM  Gentle oscillations for pain/guarding (gr I)  GH centering mobilization  PROM flexion and ER within protocol limitations    neuromuscular re-education activities to improve: Balance, Coordination, and Proprioception for 35 minutes. The following activities were included:  Supine rhythmic stabs all directions   SL ER - 3x8 2# , SL Abd 0# - 3x10  Suitcase carry 7.5# KB on band - 3 laps   Serratus wall slides foam - 3x10       therapeutic activities to improve functional performance for 00 minutes, including:        Patient Education and Home Exercises       Education provided:   - Plan of Care  - HEP  - Precautions, signs and symptoms of infection    Written Home Exercises Provided: Patient instructed to cont prior HEP. Exercises were reviewed and Kala was able to demonstrate them prior to the end of the session.  Kala demonstrated good  understanding of the education provided. See Electronic Medical Record under Patient Instructions for exercises provided during therapy sessions    Assessment     Kala is progressing well at this time. Continued to emphasis not pushing ROM as she has achieved ROM per protocol and can achieve more.able to progress to 130 degrees and with less pain and guarding. We are still using caution to avoid regaining motion too soon.     Kala is progressing well towards her goals.   Patient prognosis is Good.     Patient will continue to benefit from skilled outpatient physical therapy to address the deficits listed in the problem list box on initial evaluation, provide pt/family education and to maximize pt's level of independence in the home and community environment.     Patient's spiritual,  cultural and educational needs considered and pt agreeable to plan of care and goals.     Anticipated barriers to physical therapy: none    Goals:   Short Term Goals: 12 weeks  1.Report decreased L shoulder pain < / =  3-4/10 at worst to increase tolerance for functional activities and ADLs.   2. Increase PROM 90% of unaffected side or greater  3. Increased strength by 1/3 MMT grade in areas of deficit to increase tolerance for ADL and work activities.  4. Pt to tolerate HEP to improve ROM and independence with ADL's     Long Term Goals: 24 weeks  1.Report decreased L shoulder pain  < / =  1-2 /10 at worst  to increase tolerance for functional activities and ADLs.  2.Increase AROM to within functional limits in order to optimize performance for functional activities.  3.Increase strength to >/= 4/5 in areas of limitations to increase tolerance for ADL and work activities.  4. Pt goal: To be able to walk dogs and get shoulder back to normal  5. Pt will have improved gcode of CJ (20-40% limited) on FOTO shoulder in order to demonstrate true functional improvement.    Plan   Plan of care Certification: 4/22/2024 to 12/31/2024.     Outpatient Physical Therapy 1-2 times weekly for 21-32 weeks to include the following interventions: Manual Therapy, Neuromuscular Re-ed, Patient Education, Therapeutic Activities, and Therapeutic Exercise.     Jose Mehta, PT

## 2024-06-07 ENCOUNTER — CLINICAL SUPPORT (OUTPATIENT)
Dept: REHABILITATION | Facility: HOSPITAL | Age: 34
End: 2024-06-07
Payer: COMMERCIAL

## 2024-06-07 DIAGNOSIS — M25.512 ACUTE PAIN OF LEFT SHOULDER: ICD-10-CM

## 2024-06-07 DIAGNOSIS — R29.898 SHOULDER WEAKNESS: Primary | ICD-10-CM

## 2024-06-07 PROCEDURE — 97140 MANUAL THERAPY 1/> REGIONS: CPT

## 2024-06-07 PROCEDURE — 97110 THERAPEUTIC EXERCISES: CPT

## 2024-06-07 PROCEDURE — 97112 NEUROMUSCULAR REEDUCATION: CPT

## 2024-06-07 NOTE — PROGRESS NOTES
[facility]   Physical Therapy Treatment Note      Name: Kala Raya  Clinic Number: [id number]    Therapy Diagnosis:        Encounter Diagnoses   Name Primary?    Tear of left glenoid labrum, initial encounter      Instability of both shoulder joints      Shoulder weakness Yes    Chronic pain of both shoulders      Acute pain of left shoulder      Physician: Andrea Maciel PA-C    Visit Date: 6/7/2024     Physician Orders: PT Eval and Treat   Medical Diagnosis from Referral:   S43.432A (ICD-10-CM) - Tear of left glenoid labrum, initial encounter   M25.311,M25.312 (ICD-10-CM) - Instability of both shoulder joints   Evaluation Date: 4/22/2024  Authorization Period Expiration: 12/31/24  Plan of Care Expiration: 12/31/24  Progress Note Due: 5/22/24  Date of Surgery: 4/18/24  Visit # / Visits authorized: 9/20  FOTO: 1/3     Time In: 9:45 am  Time Out: 10:50 am  Total Billable Time: 65 minutes    Precautions: Standard     Procedure by [employer]: 4/18/24  Left shoulder Arthroscopic posterior labral repair with capsulorrhaphy   4 anchors 1:00-4:00    Post-Op Plan: Posterior labral repair      Weeks 0-4:          Subjective     Pt reports: soreness has subsided and she is doing well since being out of the sling    She was compliant with home exercise program.  Response to previous treatment: tolerated last session well  Functional change: ongoing    Pain: 2/10  Location: left shoulder      Objective      DOS: 4/18/24  POD: 6 weeks, 6 day     Observation: Pt alert and oriented. Incision site areas look good, no signs of redness, edema or infection.     Posture: slight winging of scapulas, pt in shoulder sling - donned appropriately     Passive Range of Motion:   Shoulder Left   Flexion 130   Abduction Not measured due to post op protocol   ER at 45 50   IR Not measured due to postop protocol     Treatment     Kala received the treatments listed below:      therapeutic exercises to develop  strength, endurance, and ROM for 12 minutes including:  Bicep curls pron and sup - 5# 2x10 each   Supine chest press with OH lift - 3x10    manual therapy techniques: Joint mobilizations and Soft tissue Mobilization were applied to the: L Shoulder for 10 minutes, including:    Assessment of shldr ROM  Gentle oscillations for pain/guarding (gr I)  GH centering mobilization  PROM flexion and ER within protocol limitations    neuromuscular re-education activities to improve: Balance, Coordination, and Proprioception for 38 minutes. The following activities were included:  Supine rhythmic stabs all directions 3x30s  SL ER - 3x8 3# , SL Abd 0# - 3x10   [place] carry 7.5# KB on band - 3 laps   Serratus wall slides foam - 3x10   OH carry flex with ER - YTB       therapeutic activities to improve functional performance for 00 minutes, including:        Patient Education and Home Exercises       Education provided:   - Plan of Care  - HEP  - Precautions, signs and symptoms of infection    Written Home Exercises Provided: Patient instructed to cont prior HEP. Exercises were reviewed and Kala was able to demonstrate them prior to the end of the session.  Kala demonstrated good  understanding of the education provided. See Electronic Medical Record under Patient Instructions for exercises provided during therapy sessions    Assessment     Kala is progressing well at this time. ROM is good per protocol and she is slowly and appropriately getting back full ROM. Weakness continues as evidenced by shaking and diffuclty with light weighed exercises. Will cont to progress as alexis .     Kala is progressing well towards her goals.   Patient prognosis is Good.     Patient will continue to benefit from skilled outpatient physical therapy to address the deficits listed in the problem list box on initial evaluation, provide pt/family education and to maximize pt's level of independence in the home and community environment.      Patient's spiritual, cultural and educational needs considered and pt agreeable to plan of care and goals.     Anticipated barriers to physical therapy: none    Goals:   Short Term Goals: 12 weeks  1.Report decreased L shoulder pain < / =  3-4/10 at worst to increase tolerance for functional activities and ADLs.   2. Increase PROM 90% of unaffected side or greater  3. Increased strength by 1/3 MMT grade in areas of deficit to increase tolerance for ADL and work activities.  4. Pt to tolerate HEP to improve ROM and independence with ADL's     Long Term Goals: 24 weeks  1.Report decreased L shoulder pain  < / =  1-2 /10 at worst  to increase tolerance for functional activities and ADLs.  2.Increase AROM to within functional limits in order to optimize performance for functional activities.  3.Increase strength to >/= 4/5 in areas of limitations to increase tolerance for ADL and work activities.  4. Pt goal: To be able to walk dogs and get shoulder back to normal  5. Pt will have improved gcode of CJ (20-40% limited) on FOTO shoulder in order to demonstrate true functional improvement.    Plan   Plan of care Certification: 4/22/2024 to 12/31/2024.     Outpatient Physical Therapy 1-2 times weekly for 21-32 weeks to include the following interventions: Manual Therapy, Neuromuscular Re-ed, Patient Education, Therapeutic Activities, and Therapeutic Exercise.     Jose Mehta, PT

## 2024-06-12 ENCOUNTER — CLINICAL SUPPORT (OUTPATIENT)
Dept: REHABILITATION | Facility: HOSPITAL | Age: 34
End: 2024-06-12
Payer: COMMERCIAL

## 2024-06-12 DIAGNOSIS — M25.512 ACUTE PAIN OF LEFT SHOULDER: ICD-10-CM

## 2024-06-12 DIAGNOSIS — R29.898 SHOULDER WEAKNESS: Primary | ICD-10-CM

## 2024-06-12 PROCEDURE — 97110 THERAPEUTIC EXERCISES: CPT

## 2024-06-12 PROCEDURE — 97112 NEUROMUSCULAR REEDUCATION: CPT

## 2024-06-12 PROCEDURE — 97140 MANUAL THERAPY 1/> REGIONS: CPT

## 2024-06-12 NOTE — PROGRESS NOTES
[facility]   Physical Therapy Treatment Note      Name: Kala Raya  Clinic Number: [id number]    Therapy Diagnosis:        Encounter Diagnoses   Name Primary?    Tear of left glenoid labrum, initial encounter      Instability of both shoulder joints      Shoulder weakness Yes    Chronic pain of both shoulders      Acute pain of left shoulder      Physician: Andrea Maciel PA-C    Visit Date: 6/12/2024     Physician Orders: PT Eval and Treat   Medical Diagnosis from Referral:   S43.432A (ICD-10-CM) - Tear of left glenoid labrum, initial encounter   M25.311,M25.312 (ICD-10-CM) - Instability of both shoulder joints   Evaluation Date: 4/22/2024  Authorization Period Expiration: 12/31/24  Plan of Care Expiration: 12/31/24  Progress Note Due: 5/22/24  Date of Surgery: 4/18/24  Visit # / Visits authorized: 9/20  FOTO: 1/3     Time In: 10:00 am  Time Out: 11:00 am  Total Billable Time: 60 minutes    Precautions: Standard     Procedure by [employer]: 4/18/24  Left shoulder Arthroscopic posterior labral repair with capsulorrhaphy   4 anchors 1:00-4:00    Post-Op Plan: Posterior labral repair      Weeks 0-4:          Subjective     Pt reports: she is stiff today which may be due to taking the weekend off of exercises.     She was compliant with home exercise program.  Response to previous treatment: tolerated last session well  Functional change: ongoing    Pain: 2/10  Location: left shoulder      Objective      DOS: 4/18/24  POD: 7 weeks, 6 day     Observation: Pt alert and oriented. Incision site areas look good, no signs of redness, edema or infection.     Posture: slight winging of scapulas, pt in shoulder sling - donned appropriately     Passive Range of Motion:   Shoulder Left   Flexion 130   Abduction 100   ER at 90 50   IR at 90 30     Treatment     Kala received the treatments listed below:      therapeutic exercises to develop strength, endurance, and ROM for 15 minutes including:  Pete  flexion pron/sup - 2x10   Rocking for flexion - 3x10     manual therapy techniques: Joint mobilizations and Soft tissue Mobilization were applied to the: L Shoulder for 30 minutes, including:    Assessment of shldr ROM  PROM flexion and ER within protocol limitations  Post mob grade 1  Inf mob grade 2     neuromuscular re-education activities to improve: Balance, Coordination, and Proprioception for 15 minutes. The following activities were included:  Supine rhythmic stabs all directions 3x30s  Supine 90/90 IR/ER 1# - x20       therapeutic activities to improve functional performance for 00 minutes, including:        Patient Education and Home Exercises       Education provided:   - Plan of Care  - HEP  - Precautions, signs and symptoms of infection    Written Home Exercises Provided: Patient instructed to cont prior HEP. Exercises were reviewed and Kala was able to demonstrate them prior to the end of the session.  Kala demonstrated good  understanding of the education provided. See Electronic Medical Record under Patient Instructions for exercises provided during therapy sessions    Assessment     Kala is progressing well at this time. Focused on ROM today. She did not lose ROM despite feeling very tight today. ROM continues to slowly improve as expected.     Kala is progressing well towards her goals.   Patient prognosis is Good.     Patient will continue to benefit from skilled outpatient physical therapy to address the deficits listed in the problem list box on initial evaluation, provide pt/family education and to maximize pt's level of independence in the home and community environment.     Patient's spiritual, cultural and educational needs considered and pt agreeable to plan of care and goals.     Anticipated barriers to physical therapy: none    Goals:   Short Term Goals: 12 weeks  1.Report decreased L shoulder pain < / =  3-4/10 at worst to increase tolerance for functional activities and ADLs.   2.  Increase PROM 90% of unaffected side or greater  3. Increased strength by 1/3 MMT grade in areas of deficit to increase tolerance for ADL and work activities.  4. Pt to tolerate HEP to improve ROM and independence with ADL's     Long Term Goals: 24 weeks  1.Report decreased L shoulder pain  < / =  1-2 /10 at worst  to increase tolerance for functional activities and ADLs.  2.Increase AROM to within functional limits in order to optimize performance for functional activities.  3.Increase strength to >/= 4/5 in areas of limitations to increase tolerance for ADL and work activities.  4. Pt goal: To be able to walk dogs and get shoulder back to normal  5. Pt will have improved gcode of CJ (20-40% limited) on FOTO shoulder in order to demonstrate true functional improvement.    Plan   Plan of care Certification: 4/22/2024 to 12/31/2024.     Outpatient Physical Therapy 1-2 times weekly for 21-32 weeks to include the following interventions: Manual Therapy, Neuromuscular Re-ed, Patient Education, Therapeutic Activities, and Therapeutic Exercise.     Jose Mehta, PT

## 2024-06-14 ENCOUNTER — CLINICAL SUPPORT (OUTPATIENT)
Dept: REHABILITATION | Facility: HOSPITAL | Age: 34
End: 2024-06-14
Payer: COMMERCIAL

## 2024-06-14 DIAGNOSIS — R29.898 SHOULDER WEAKNESS: Primary | ICD-10-CM

## 2024-06-14 DIAGNOSIS — M25.512 ACUTE PAIN OF LEFT SHOULDER: ICD-10-CM

## 2024-06-14 PROCEDURE — 97140 MANUAL THERAPY 1/> REGIONS: CPT

## 2024-06-14 PROCEDURE — 97110 THERAPEUTIC EXERCISES: CPT

## 2024-06-14 PROCEDURE — 97112 NEUROMUSCULAR REEDUCATION: CPT

## 2024-06-14 NOTE — PROGRESS NOTES
[facility]   Physical Therapy Treatment Note      Name: Kala Raya  Clinic Number: [id number]    Therapy Diagnosis:        Encounter Diagnoses   Name Primary?    Tear of left glenoid labrum, initial encounter      Instability of both shoulder joints      Shoulder weakness Yes    Chronic pain of both shoulders      Acute pain of left shoulder      Physician: Andrea Maciel PA-C    Visit Date: 6/14/2024     Physician Orders: PT Eval and Treat   Medical Diagnosis from Referral:   S43.432A (ICD-10-CM) - Tear of left glenoid labrum, initial encounter   M25.311,M25.312 (ICD-10-CM) - Instability of both shoulder joints   Evaluation Date: 4/22/2024  Authorization Period Expiration: 12/31/24  Plan of Care Expiration: 12/31/24  Progress Note Due: 5/22/24  Date of Surgery: 4/18/24  Visit # / Visits authorized: 12/20  FOTO: 1/3     Time In: 10:00 am  Time Out: 11:00 am  Total Billable Time: 60 minutes    Precautions: Standard     Procedure by [employer]: 4/18/24  Left shoulder Arthroscopic posterior labral repair with capsulorrhaphy   4 anchors 1:00-4:00    Post-Op Plan: Posterior labral repair      Weeks 0-4:          Subjective     Pt reports: she is stiff today which may be due to taking the weekend off of exercises.     She was compliant with home exercise program.  Response to previous treatment: tolerated last session well  Functional change: ongoing    Pain: 2/10  Location: left shoulder      Objective      DOS: 4/18/24  POD: 8 weeks, 1 day     Observation: Pt alert and oriented. Incision site areas look good, no signs of redness, edema or infection.     Posture: slight winging of scapulas, pt in shoulder sling - donned appropriately     Passive Range of Motion:   Shoulder Left   Flexion 130   Abduction 100   ER at 90 50   IR at 90 30     Treatment     Kala received the treatments listed below:      therapeutic exercises to develop strength, endurance, and ROM for 15 minutes  including:  Dowel flexion pron/sup - 2x10   Rocking for flexion - 3x10     manual therapy techniques: Joint mobilizations and Soft tissue Mobilization were applied to the: L Shoulder for 15 minutes, including:    Assessment of shldr ROM  PROM flexion and ER within protocol limitations  Post mob grade 1  Inf mob grade 2     neuromuscular re-education activities to improve: Balance, Coordination, and Proprioception for 30 minutes. The following activities were included:  Supine rhythmic stabs all directions 3x30s  Supine 90/90 IR/ER 1# - x20   Serratus wall slide FR - 2x10   ER at 90 flexion OTB 3x10      therapeutic activities to improve functional performance for 00 minutes, including:        Patient Education and Home Exercises       Education provided:   - Plan of Care  - HEP  - Precautions, signs and symptoms of infection    Written Home Exercises Provided: Patient instructed to cont prior HEP. Exercises were reviewed and Kala was able to demonstrate them prior to the end of the session.  Kala demonstrated good  understanding of the education provided. See Electronic Medical Record under Patient Instructions for exercises provided during therapy sessions    Assessment     Kala is progressing well at this time. Focused on ROM today. She did not lose ROM despite feeling very tight today. ROM continues to slowly improve as expected.     Kala is progressing well towards her goals.   Patient prognosis is Good.     Patient will continue to benefit from skilled outpatient physical therapy to address the deficits listed in the problem list box on initial evaluation, provide pt/family education and to maximize pt's level of independence in the home and community environment.     Patient's spiritual, cultural and educational needs considered and pt agreeable to plan of care and goals.     Anticipated barriers to physical therapy: none    Goals:   Short Term Goals: 12 weeks  1.Report decreased L shoulder pain < / =   3-4/10 at worst to increase tolerance for functional activities and ADLs.   2. Increase PROM 90% of unaffected side or greater  3. Increased strength by 1/3 MMT grade in areas of deficit to increase tolerance for ADL and work activities.  4. Pt to tolerate HEP to improve ROM and independence with ADL's     Long Term Goals: 24 weeks  1.Report decreased L shoulder pain  < / =  1-2 /10 at worst  to increase tolerance for functional activities and ADLs.  2.Increase AROM to within functional limits in order to optimize performance for functional activities.  3.Increase strength to >/= 4/5 in areas of limitations to increase tolerance for ADL and work activities.  4. Pt goal: To be able to walk dogs and get shoulder back to normal  5. Pt will have improved gcode of CJ (20-40% limited) on FOTO shoulder in order to demonstrate true functional improvement.    Plan   Plan of care Certification: 4/22/2024 to 12/31/2024.     Outpatient Physical Therapy 1-2 times weekly for 21-32 weeks to include the following interventions: Manual Therapy, Neuromuscular Re-ed, Patient Education, Therapeutic Activities, and Therapeutic Exercise.     Jose Mehta, PT

## 2024-06-17 ENCOUNTER — CLINICAL SUPPORT (OUTPATIENT)
Dept: REHABILITATION | Facility: HOSPITAL | Age: 34
End: 2024-06-17
Payer: COMMERCIAL

## 2024-06-17 DIAGNOSIS — R29.898 SHOULDER WEAKNESS: Primary | ICD-10-CM

## 2024-06-17 DIAGNOSIS — M25.512 ACUTE PAIN OF LEFT SHOULDER: ICD-10-CM

## 2024-06-17 PROCEDURE — 97530 THERAPEUTIC ACTIVITIES: CPT

## 2024-06-17 PROCEDURE — 97112 NEUROMUSCULAR REEDUCATION: CPT

## 2024-06-17 PROCEDURE — 97140 MANUAL THERAPY 1/> REGIONS: CPT

## 2024-06-17 PROCEDURE — 97110 THERAPEUTIC EXERCISES: CPT

## 2024-06-17 NOTE — PROGRESS NOTES
[facility]   Physical Therapy Treatment Note      Name: Kala Raya  Clinic Number: [id number]    Therapy Diagnosis:        Encounter Diagnoses   Name Primary?    Tear of left glenoid labrum, initial encounter      Instability of both shoulder joints      Shoulder weakness Yes    Chronic pain of both shoulders      Acute pain of left shoulder      Physician: Andrea Maciel PA-C    Visit Date: 6/17/2024     Physician Orders: PT Eval and Treat   Medical Diagnosis from Referral:   S43.432A (ICD-10-CM) - Tear of left glenoid labrum, initial encounter   M25.311,M25.312 (ICD-10-CM) - Instability of both shoulder joints   Evaluation Date: 4/22/2024  Authorization Period Expiration: 12/31/24  Plan of Care Expiration: 12/31/24  Progress Note Due: 5/22/24  Date of Surgery: 4/18/24  Visit # / Visits authorized: 14/20  FOTO: 1/3     Time In: 10:00 am  Time Out: 11:00 am  Total Billable Time: 60 minutes    Precautions: Standard     Procedure by [employer]: 4/18/24  Left shoulder Arthroscopic posterior labral repair with capsulorrhaphy   4 anchors 1:00-4:00    Post-Op Plan: Posterior labral repair      Weeks 0-4:          Subjective     Pt reports: no issues today     She was compliant with home exercise program.  Response to previous treatment: tolerated last session well  Functional change: ongoing    Pain: 2/10  Location: left shoulder      Objective      DOS: 4/18/24  POD: 8 weeks, 4 day     Observation: Pt alert and oriented. Incision site areas look good, no signs of redness, edema or infection.     Posture: slight winging of scapulas, pt in shoulder sling - donned appropriately     Passive Range of Motion:   Shoulder Left   Flexion 150   Abduction 120   ER at 90 50   IR at 90 30     Treatment     Kala received the treatments listed below:      therapeutic exercises to develop strength, endurance, and ROM for 08 minutes including:  Dowel flexion pron/sup - 2x10   LLLD inf glide 3# - 5 mins      manual therapy techniques: Joint mobilizations and Soft tissue Mobilization were applied to the: L Shoulder for 08 minutes, including:    Assessment of shldr ROM  PROM flexion and ER within protocol limitations  Post mob grade 1  Inf mob grade 2     neuromuscular re-education activities to improve: Balance, Coordination, and Proprioception for 29 minutes. The following activities were included:  Supine rhythmic stabs all directions 3x30s  Supine 90/90 IR/ER 2# - x20   Prone rows 3# 3x8   Prone horiz abd 3x5   S/L ER 3# 3x8  Ball on wall perturbations - 2x30s        therapeutic activities to improve functional performance for 15 minutes, including:  Statue [place] carry with band +5# - 2 laos   Arm bars - 5# 2x5      Patient Education and Home Exercises       Education provided:   - Plan of Care  - HEP  - Precautions, signs and symptoms of infection    Written Home Exercises Provided: Patient instructed to cont prior HEP. Exercises were reviewed and Kala was able to demonstrate them prior to the end of the session.  Kala demonstrated good  understanding of the education provided. See Electronic Medical Record under Patient Instructions for exercises provided during therapy sessions    Assessment     Kala is progressing well at this time. Focused on strength and stability today. ROM continues to slowly improve as expected.     Kala is progressing well towards her goals.   Patient prognosis is Good.     Patient will continue to benefit from skilled outpatient physical therapy to address the deficits listed in the problem list box on initial evaluation, provide pt/family education and to maximize pt's level of independence in the home and community environment.     Patient's spiritual, cultural and educational needs considered and pt agreeable to plan of care and goals.     Anticipated barriers to physical therapy: none    Goals:   Short Term Goals: 12 weeks  1.Report decreased L shoulder pain < / =  3-4/10  at worst to increase tolerance for functional activities and ADLs.   2. Increase PROM 90% of unaffected side or greater  3. Increased strength by 1/3 MMT grade in areas of deficit to increase tolerance for ADL and work activities.  4. Pt to tolerate HEP to improve ROM and independence with ADL's     Long Term Goals: 24 weeks  1.Report decreased L shoulder pain  < / =  1-2 /10 at worst  to increase tolerance for functional activities and ADLs.  2.Increase AROM to within functional limits in order to optimize performance for functional activities.  3.Increase strength to >/= 4/5 in areas of limitations to increase tolerance for ADL and work activities.  4. Pt goal: To be able to walk dogs and get shoulder back to normal  5. Pt will have improved gcode of CJ (20-40% limited) on FOTO shoulder in order to demonstrate true functional improvement.    Plan   Plan of care Certification: 4/22/2024 to 12/31/2024.     Outpatient Physical Therapy 1-2 times weekly for 21-32 weeks to include the following interventions: Manual Therapy, Neuromuscular Re-ed, Patient Education, Therapeutic Activities, and Therapeutic Exercise.     Jose Mehta, PT

## 2024-06-24 ENCOUNTER — PATIENT MESSAGE (OUTPATIENT)
Dept: REHABILITATION | Facility: HOSPITAL | Age: 34
End: 2024-06-24
Payer: COMMERCIAL

## 2024-06-26 ENCOUNTER — CLINICAL SUPPORT (OUTPATIENT)
Dept: REHABILITATION | Facility: HOSPITAL | Age: 34
End: 2024-06-26
Payer: COMMERCIAL

## 2024-06-26 DIAGNOSIS — M25.512 ACUTE PAIN OF LEFT SHOULDER: ICD-10-CM

## 2024-06-26 DIAGNOSIS — R29.898 SHOULDER WEAKNESS: Primary | ICD-10-CM

## 2024-06-26 PROCEDURE — 97140 MANUAL THERAPY 1/> REGIONS: CPT

## 2024-06-26 PROCEDURE — 97112 NEUROMUSCULAR REEDUCATION: CPT

## 2024-06-26 PROCEDURE — 97110 THERAPEUTIC EXERCISES: CPT

## 2024-06-26 PROCEDURE — 97530 THERAPEUTIC ACTIVITIES: CPT

## 2024-06-26 NOTE — PROGRESS NOTES
[facility]   Physical Therapy Treatment Note      Name: Kala Raya  Clinic Number: [id number]    Therapy Diagnosis:        Encounter Diagnoses   Name Primary?    Tear of left glenoid labrum, initial encounter      Instability of both shoulder joints      Shoulder weakness Yes    Chronic pain of both shoulders      Acute pain of left shoulder      Physician: Andrea Maciel PA-C    Visit Date: 6/26/2024     Physician Orders: PT Eval and Treat   Medical Diagnosis from Referral:   S43.432A (ICD-10-CM) - Tear of left glenoid labrum, initial encounter   M25.311,M25.312 (ICD-10-CM) - Instability of both shoulder joints   Evaluation Date: 4/22/2024  Authorization Period Expiration: 12/31/24  Plan of Care Expiration: 12/31/24  Progress Note Due: 5/22/24  Date of Surgery: 4/18/24  Visit # / Visits authorized: 15/20  FOTO: 1/3     Time In: 2:30 pm  Time Out: 3:35 pm  Total Billable Time: 65 minutes    Precautions: Standard     Procedure by [employer]: 4/18/24  Left shoulder Arthroscopic posterior labral repair with capsulorrhaphy   4 anchors 1:00-4:00    Post-Op Plan: Posterior labral repair      Weeks 0-4:          Subjective     Pt reports: no issues today     She was compliant with home exercise program.  Response to previous treatment: tolerated last session well  Functional change: ongoing    Pain: 2/10  Location: left shoulder      Objective      DOS: 4/18/24  POD: 9 weeks, 4 day     Observation: Pt alert and oriented. Incision site areas look good, no signs of redness, edema or infection.     Posture: slight winging of scapulas, pt in shoulder sling - donned appropriately     Passive Range of Motion:   Shoulder Left   Flexion 160   Abduction 125   ER at 90 70   IR at 90 30     Treatment     Kala received the treatments listed below:      therapeutic exercises to develop strength, endurance, and ROM for 15 minutes including:  Lat stretch - 10x10s  LLLD inf glide 3# - 5 mins   Prayer stretch  10x10s     manual therapy techniques: Joint mobilizations and Soft tissue Mobilization were applied to the: L Shoulder for 10 minutes, including:    Assessment of shldr ROM  PROM flexion and ER within protocol limitations  Post mob grade 1  Inf mob grade 2     neuromuscular re-education activities to improve: Balance, Coordination, and Proprioception for 25 minutes. The following activities were included:  Supine rhythmic stabs all directions 3x30s  Prone rows 3# 3x8   Prone horiz abd 3x5   S/L ER 3# 3x8  Ball on wall ABCs - 3x10    therapeutic activities to improve functional performance for 15 minutes, including:  Statue of [place] carry with band +5# - 2 laps   Arm bars - 5# 2x5      Patient Education and Home Exercises       Education provided:   - Plan of Care  - HEP  - Precautions, signs and symptoms of infection    Written Home Exercises Provided: Patient instructed to cont prior HEP. Exercises were reviewed and Kala was able to demonstrate them prior to the end of the session.  Kala demonstrated good  understanding of the education provided. See Electronic Medical Record under Patient Instructions for exercises provided during therapy sessions    Assessment     Kala is progressing well at this time. Focused on strength and stability today. ROM continues to slowly improve as expected.     Kala is progressing well towards her goals.   Patient prognosis is Good.     Patient will continue to benefit from skilled outpatient physical therapy to address the deficits listed in the problem list box on initial evaluation, provide pt/family education and to maximize pt's level of independence in the home and community environment.     Patient's spiritual, cultural and educational needs considered and pt agreeable to plan of care and goals.     Anticipated barriers to physical therapy: none    Goals:   Short Term Goals: 12 weeks  1.Report decreased L shoulder pain < / =  3-4/10 at worst to increase tolerance for  functional activities and ADLs.   2. Increase PROM 90% of unaffected side or greater  3. Increased strength by 1/3 MMT grade in areas of deficit to increase tolerance for ADL and work activities.  4. Pt to tolerate HEP to improve ROM and independence with ADL's     Long Term Goals: 24 weeks  1.Report decreased L shoulder pain  < / =  1-2 /10 at worst  to increase tolerance for functional activities and ADLs.  2.Increase AROM to within functional limits in order to optimize performance for functional activities.  3.Increase strength to >/= 4/5 in areas of limitations to increase tolerance for ADL and work activities.  4. Pt goal: To be able to walk dogs and get shoulder back to normal  5. Pt will have improved gcode of CJ (20-40% limited) on FOTO shoulder in order to demonstrate true functional improvement.    Plan   Plan of care Certification: 4/22/2024 to 12/31/2024.     Outpatient Physical Therapy 1-2 times weekly for 21-32 weeks to include the following interventions: Manual Therapy, Neuromuscular Re-ed, Patient Education, Therapeutic Activities, and Therapeutic Exercise.     Jose Mehta, PT

## 2024-07-01 ENCOUNTER — CLINICAL SUPPORT (OUTPATIENT)
Dept: REHABILITATION | Facility: HOSPITAL | Age: 34
End: 2024-07-01
Payer: COMMERCIAL

## 2024-07-01 ENCOUNTER — PATIENT MESSAGE (OUTPATIENT)
Dept: OBSTETRICS AND GYNECOLOGY | Facility: CLINIC | Age: 34
End: 2024-07-01
Payer: COMMERCIAL

## 2024-07-01 DIAGNOSIS — M25.512 ACUTE PAIN OF LEFT SHOULDER: ICD-10-CM

## 2024-07-01 DIAGNOSIS — R29.898 SHOULDER WEAKNESS: Primary | ICD-10-CM

## 2024-07-01 PROCEDURE — 97110 THERAPEUTIC EXERCISES: CPT

## 2024-07-01 PROCEDURE — 97530 THERAPEUTIC ACTIVITIES: CPT

## 2024-07-01 PROCEDURE — 97112 NEUROMUSCULAR REEDUCATION: CPT

## 2024-07-01 PROCEDURE — 97140 MANUAL THERAPY 1/> REGIONS: CPT

## 2024-07-01 RX ORDER — DESOGESTREL AND ETHINYL ESTRADIOL 0.15-0.03
1 KIT ORAL DAILY
Qty: 90 TABLET | Refills: 0 | Status: SHIPPED | OUTPATIENT
Start: 2024-07-01 | End: 2025-09-24

## 2024-07-01 NOTE — PROGRESS NOTES
[facility]   Physical Therapy Treatment Note      Name: Kala Raya  Clinic Number: [id number]    Therapy Diagnosis:        Encounter Diagnoses   Name Primary?    Tear of left glenoid labrum, initial encounter      Instability of both shoulder joints      Shoulder weakness Yes    Chronic pain of both shoulders      Acute pain of left shoulder      Physician: Andrea Maciel PA-C    Visit Date: 7/1/2024     Physician Orders: PT Eval and Treat   Medical Diagnosis from Referral:   S43.432A (ICD-10-CM) - Tear of left glenoid labrum, initial encounter   M25.311,M25.312 (ICD-10-CM) - Instability of both shoulder joints   Evaluation Date: 4/22/2024  Authorization Period Expiration: 12/31/24  Plan of Care Expiration: 12/31/24  Progress Note Due: 5/22/24  Date of Surgery: 4/18/24  Visit # / Visits authorized: 15/20  FOTO: 1/3     Time In: 2:30 pm  Time Out: 3:35 pm  Total Billable Time: 65 minutes    Precautions: Standard     Procedure by [employer]: 4/18/24  Left shoulder Arthroscopic posterior labral repair with capsulorrhaphy   4 anchors 1:00-4:00    Post-Op Plan: Posterior labral repair      Weeks 0-4:              Subjective     Pt reports: no issues today     She was compliant with home exercise program.  Response to previous treatment: tolerated last session well  Functional change: ongoing    Pain: 2/10  Location: left shoulder      Objective      DOS: 4/18/24  POD: 10 weeks, 4 day     Observation: Pt alert and oriented. Incision site areas look good, no signs of redness, edema or infection.     Posture: slight winging of scapulas, pt in shoulder sling - donned appropriately     Passive Range of Motion:   Shoulder Left   Flexion 160   Abduction 125   ER at 90 70   IR at 90 30     Treatment     Kala received the treatments listed below:      therapeutic exercises to develop strength, endurance, and ROM for 15 minutes including:  Lat stretch - 10x10s  LLLD inf glide 3# - 5 mins   Prayer  stretch 10x10s     manual therapy techniques: Joint mobilizations and Soft tissue Mobilization were applied to the: L Shoulder for 10 minutes, including:    Assessment of shldr ROM  PROM flexion and ER within protocol limitations  Post mob grade 1  Inf mob grade 2     neuromuscular re-education activities to improve: Balance, Coordination, and Proprioception for 25 minutes. The following activities were included:  Supine rhythmic stabs all directions 3x30s  Prone rows 3# 3x8   Prone horiz abd 3x5   Supine ER with arm supported 3x12  Ball on wall ABCs - 3x10    therapeutic activities to improve functional performance for 15 minutes, including:  Statue of liberty carry with band +5# - 2 laps   Arm bars - 5# 2x5      Patient Education and Home Exercises       Education provided:   - Plan of Care  - HEP  - Precautions, signs and symptoms of infection    Written Home Exercises Provided: Patient instructed to cont prior HEP. Exercises were reviewed and Kala was able to demonstrate them prior to the end of the session.  Kala demonstrated good  understanding of the education provided. See Electronic Medical Record under Patient Instructions for exercises provided during therapy sessions    Assessment     Kala is progressing well at this time. Focused on strength and stability today. ROM continues to slowly improve as expected.     Kala is progressing well towards her goals.   Patient prognosis is Good.     Patient will continue to benefit from skilled outpatient physical therapy to address the deficits listed in the problem list box on initial evaluation, provide pt/family education and to maximize pt's level of independence in the home and community environment.     Patient's spiritual, cultural and educational needs considered and pt agreeable to plan of care and goals.     Anticipated barriers to physical therapy: none    Goals:   Short Term Goals: 12 weeks  1.Report decreased L shoulder pain < / =  3-4/10 at worst  to increase tolerance for functional activities and ADLs.   2. Increase PROM 90% of unaffected side or greater  3. Increased strength by 1/3 MMT grade in areas of deficit to increase tolerance for ADL and work activities.  4. Pt to tolerate HEP to improve ROM and independence with ADL's     Long Term Goals: 24 weeks  1.Report decreased L shoulder pain  < / =  1-2 /10 at worst  to increase tolerance for functional activities and ADLs.  2.Increase AROM to within functional limits in order to optimize performance for functional activities.  3.Increase strength to >/= 4/5 in areas of limitations to increase tolerance for ADL and work activities.  4. Pt goal: To be able to walk dogs and get shoulder back to normal  5. Pt will have improved gcode of CJ (20-40% limited) on FOTO shoulder in order to demonstrate true functional improvement.    Plan   Plan of care Certification: 4/22/2024 to 12/31/2024.     Outpatient Physical Therapy 1-2 times weekly for 21-32 weeks to include the following interventions: Manual Therapy, Neuromuscular Re-ed, Patient Education, Therapeutic Activities, and Therapeutic Exercise.     Jose Mehta, PT

## 2024-08-01 ENCOUNTER — PATIENT MESSAGE (OUTPATIENT)
Dept: SPORTS MEDICINE | Facility: CLINIC | Age: 34
End: 2024-08-01
Payer: COMMERCIAL

## 2024-08-01 DIAGNOSIS — Z98.890 S/P ARTHROSCOPY OF LEFT SHOULDER: Primary | ICD-10-CM

## 2024-08-01 DIAGNOSIS — S43.432A TEAR OF LEFT GLENOID LABRUM, INITIAL ENCOUNTER: ICD-10-CM

## 2024-10-23 DIAGNOSIS — G47.00 INSOMNIA, UNSPECIFIED TYPE: ICD-10-CM

## 2024-10-23 RX ORDER — TRAZODONE HYDROCHLORIDE 50 MG/1
TABLET ORAL
Qty: 30 TABLET | Refills: 6 | Status: SHIPPED | OUTPATIENT
Start: 2024-10-23

## 2024-10-23 NOTE — TELEPHONE ENCOUNTER
Care Due:                  Date            Visit Type   Department     Provider  --------------------------------------------------------------------------------                                MYCHART                              ANNUAL                              CHECKUP/PHY  [facility] INTERNAL  Last Visit: 09-      [facility]       [place]  Next Visit: None Scheduled  None         None Found                                                            Last  Test          Frequency    Reason                     Performed    Due Date  --------------------------------------------------------------------------------    Office Visit  15 months..  traZODone................  09- 12-    [facility] Embedded Care Due Messages. Reference number: [id number].   10/23/2024 4:41:14 PM CDT

## 2024-10-24 RX ORDER — DESOGESTREL AND ETHINYL ESTRADIOL 0.15-0.03
1 KIT ORAL DAILY
Qty: 90 TABLET | Refills: 0 | Status: SHIPPED | OUTPATIENT
Start: 2024-10-24 | End: 2026-01-17

## 2024-10-24 NOTE — TELEPHONE ENCOUNTER
Refill Routing Note   Medication(s) are not appropriate for processing by [facility] for the following reason(s):        Patient not seen by provider within 15 months    [facility] action(s):  Defer               Appointments  past 12m or future 3m with PCP    Date Provider   Last Visit   7/13/2023 Aidan Oneil MD   Next Visit   Visit date not found Aidan Oneil MD   ED visits in past 90 days: 0        Note composed:6:36 AM 10/24/2024

## 2025-01-25 ENCOUNTER — PATIENT MESSAGE (OUTPATIENT)
Dept: OBSTETRICS AND GYNECOLOGY | Facility: CLINIC | Age: 35
End: 2025-01-25
Payer: COMMERCIAL

## 2025-01-27 DIAGNOSIS — Z30.41 ENCOUNTER FOR SURVEILLANCE OF CONTRACEPTIVE PILLS: Primary | ICD-10-CM

## 2025-01-27 RX ORDER — DESOGESTREL AND ETHINYL ESTRADIOL 0.15-0.03
1 KIT ORAL DAILY
Qty: 90 TABLET | Refills: 0 | Status: SHIPPED | OUTPATIENT
Start: 2025-01-27 | End: 2026-04-22

## 2025-06-27 DIAGNOSIS — Z30.41 ENCOUNTER FOR SURVEILLANCE OF CONTRACEPTIVE PILLS: ICD-10-CM

## 2025-06-27 RX ORDER — DESOGESTREL AND ETHINYL ESTRADIOL 0.15-0.03
1 KIT ORAL DAILY
Qty: 84 TABLET | Refills: 0 | Status: SHIPPED | OUTPATIENT
Start: 2025-06-27

## 2025-06-27 NOTE — TELEPHONE ENCOUNTER
Refill Routing Note   Medication(s) are not appropriate for processing by [facility] for the following reason(s):        Patient not seen by provider within 15 months    [facility] action(s):  Defer               Appointments  past 12m or future 3m with PCP    Date Provider   Last Visit   Visit date not found Anastacia Pena MD   Next Visit   Visit date not found Anastacia Pena MD   ED visits in past 90 days: 0        Note composed:2:19 PM 06/27/2025

## (undated) DEVICE — GOWN ECLIPSE REINF L4 XLNG XXL

## (undated) DEVICE — DRAPE STERI-DRAPE 1000 17X11IN

## (undated) DEVICE — CONNECTOR TUBING STR 5 IN 1

## (undated) DEVICE — GLOVE PROTEXIS LTX MICRO 8

## (undated) DEVICE — KIT FIBERTAK CURVED SPEAR 1.8M

## (undated) DEVICE — GEMINI ARTHREX SR8

## (undated) DEVICE — ADHESIVE DERMABOND ADVANCED

## (undated) DEVICE — Device

## (undated) DEVICE — SOL NACL IRR 3000ML

## (undated) DEVICE — SOL NACL IRR 1000ML BTL

## (undated) DEVICE — BLADE SHAVER 3.5MM

## (undated) DEVICE — COVER LIGHT HANDLE 80/CA

## (undated) DEVICE — CANNULA TRIPLEDAM 6MM X 7CM

## (undated) DEVICE — PAD DERMAPROX THCK 11X15X1IN

## (undated) DEVICE — DRAPE THREE-QTR REINF 53X77IN

## (undated) DEVICE — GOWN ECLIPSE REINF LVL4 TWL XL

## (undated) DEVICE — GLOVE PROTEXIS LIGHT BROWN 8.5

## (undated) DEVICE — ANCHOR SUT FIBERTAK 1.8 KNTLS: Type: IMPLANTABLE DEVICE | Site: SHOULDER | Status: NON-FUNCTIONAL

## (undated) DEVICE — NDL SPINAL 18GX3.5 SPINOCAN

## (undated) DEVICE — SET EXT MALE LL 34IN

## (undated) DEVICE — SHAVER ULTRAFFR 4.2MM

## (undated) DEVICE — COVER PROXIMA MAYO STAND

## (undated) DEVICE — DRESSING GAUZE OIL EMUL 3X8

## (undated) DEVICE — BURR OVAL CUTTING 6 MM

## (undated) DEVICE — SET INFLOW TUBE ARTHSCP

## (undated) DEVICE — DRAPE INCISE IOBAN 2 23X17IN

## (undated) DEVICE — SUT MONOCRYL PLUS UD 3-0 27

## (undated) DEVICE — KIT FIBERTAK PERC INSRT 1.8MM

## (undated) DEVICE — BLADE SURG 13CMX4.2MM

## (undated) DEVICE — KIT TRACTION SHLDR ST DISP LF

## (undated) DEVICE — PAD ABDOMINAL STERILE 8X10IN

## (undated) DEVICE — APPLICATOR CHLORAPREP ORN 26ML

## (undated) DEVICE — GOWN ECLIPSE REINF LV4 XLNG XL

## (undated) DEVICE — DRAPE STERI U-SHAPED 47X51IN

## (undated) DEVICE — HOOK SUTURE SPECTRUM II DISP

## (undated) DEVICE — PROBE ARTHSCP RF90 D 140MM

## (undated) DEVICE — SUT 1 36IN PDS II VIO MONO

## (undated) DEVICE — TAPE SURG MEDIPORE 6X72IN

## (undated) DEVICE — SUT ETHILON 3/0 18IN PS-1

## (undated) DEVICE — TUBING SUC UNIV W/CONN 12FT

## (undated) DEVICE — WRAP SHLDR HIP ACCU THRM PACK

## (undated) DEVICE — COVER CAMERA OPERATING ROOM

## (undated) DEVICE — SPONGE COTTON TRAY 4X4IN

## (undated) DEVICE — TOWEL OR DISP STRL BLUE 4/PK

## (undated) DEVICE — COVATOR 20